# Patient Record
Sex: MALE | Race: WHITE | NOT HISPANIC OR LATINO | URBAN - METROPOLITAN AREA
[De-identification: names, ages, dates, MRNs, and addresses within clinical notes are randomized per-mention and may not be internally consistent; named-entity substitution may affect disease eponyms.]

---

## 2017-01-11 ENCOUNTER — EMERGENCY (EMERGENCY)
Facility: HOSPITAL | Age: 82
LOS: 0 days | Discharge: HOME | End: 2017-01-11
Admitting: EMERGENCY MEDICINE
Payer: COMMERCIAL

## 2017-01-11 DIAGNOSIS — R51 HEADACHE: ICD-10-CM

## 2017-01-11 DIAGNOSIS — Z79.01 LONG TERM (CURRENT) USE OF ANTICOAGULANTS: ICD-10-CM

## 2017-01-11 DIAGNOSIS — Z95.0 PRESENCE OF CARDIAC PACEMAKER: ICD-10-CM

## 2017-01-11 DIAGNOSIS — Z95.1 PRESENCE OF AORTOCORONARY BYPASS GRAFT: ICD-10-CM

## 2017-01-11 DIAGNOSIS — I25.10 ATHEROSCLEROTIC HEART DISEASE OF NATIVE CORONARY ARTERY WITHOUT ANGINA PECTORIS: ICD-10-CM

## 2017-01-11 DIAGNOSIS — Z79.02 LONG TERM (CURRENT) USE OF ANTITHROMBOTICS/ANTIPLATELETS: ICD-10-CM

## 2017-01-11 DIAGNOSIS — I10 ESSENTIAL (PRIMARY) HYPERTENSION: ICD-10-CM

## 2017-01-11 PROCEDURE — 93010 ELECTROCARDIOGRAM REPORT: CPT

## 2017-01-11 PROCEDURE — 70450 CT HEAD/BRAIN W/O DYE: CPT | Mod: 26

## 2017-01-11 PROCEDURE — 99284 EMERGENCY DEPT VISIT MOD MDM: CPT

## 2017-01-14 ENCOUNTER — INPATIENT (INPATIENT)
Facility: HOSPITAL | Age: 82
LOS: 1 days | Discharge: HOME HEALTH | End: 2017-01-16
Attending: FAMILY MEDICINE | Admitting: INTERNAL MEDICINE
Payer: MEDICARE

## 2017-01-14 PROCEDURE — 93010 ELECTROCARDIOGRAM REPORT: CPT

## 2017-01-14 PROCEDURE — 71010: CPT | Mod: 26

## 2017-01-14 PROCEDURE — 99285 EMERGENCY DEPT VISIT HI MDM: CPT

## 2017-01-15 PROCEDURE — 93010 ELECTROCARDIOGRAM REPORT: CPT

## 2017-01-20 DIAGNOSIS — J44.1 CHRONIC OBSTRUCTIVE PULMONARY DISEASE WITH (ACUTE) EXACERBATION: ICD-10-CM

## 2017-01-20 DIAGNOSIS — I25.10 ATHEROSCLEROTIC HEART DISEASE OF NATIVE CORONARY ARTERY WITHOUT ANGINA PECTORIS: ICD-10-CM

## 2017-01-20 DIAGNOSIS — Z87.891 PERSONAL HISTORY OF NICOTINE DEPENDENCE: ICD-10-CM

## 2017-01-20 DIAGNOSIS — I11.0 HYPERTENSIVE HEART DISEASE WITH HEART FAILURE: ICD-10-CM

## 2017-01-20 DIAGNOSIS — N40.0 BENIGN PROSTATIC HYPERPLASIA WITHOUT LOWER URINARY TRACT SYMPTOMS: ICD-10-CM

## 2017-01-20 DIAGNOSIS — I49.5 SICK SINUS SYNDROME: ICD-10-CM

## 2017-01-20 DIAGNOSIS — E03.9 HYPOTHYROIDISM, UNSPECIFIED: ICD-10-CM

## 2017-01-20 DIAGNOSIS — I48.91 UNSPECIFIED ATRIAL FIBRILLATION: ICD-10-CM

## 2017-01-20 DIAGNOSIS — I50.9 HEART FAILURE, UNSPECIFIED: ICD-10-CM

## 2017-01-30 ENCOUNTER — INPATIENT (INPATIENT)
Facility: HOSPITAL | Age: 82
LOS: 1 days | Discharge: HOME | End: 2017-02-01
Attending: FAMILY MEDICINE | Admitting: FAMILY MEDICINE
Payer: MEDICARE

## 2017-01-30 VITALS
TEMPERATURE: 98 F | SYSTOLIC BLOOD PRESSURE: 175 MMHG | RESPIRATION RATE: 20 BRPM | HEART RATE: 82 BPM | DIASTOLIC BLOOD PRESSURE: 86 MMHG

## 2017-01-30 DIAGNOSIS — J44.1 CHRONIC OBSTRUCTIVE PULMONARY DISEASE WITH (ACUTE) EXACERBATION: ICD-10-CM

## 2017-01-30 LAB
ALBUMIN SERPL ELPH-MCNC: 3.5 G/DL — SIGNIFICANT CHANGE UP (ref 3.3–5)
ALP SERPL-CCNC: 55 U/L — SIGNIFICANT CHANGE UP (ref 40–120)
ALT FLD-CCNC: 20 U/L — SIGNIFICANT CHANGE UP (ref 12–78)
ANION GAP SERPL CALC-SCNC: 7 MMOL/L — SIGNIFICANT CHANGE UP (ref 5–17)
APTT BLD: 34.7 SEC — SIGNIFICANT CHANGE UP (ref 27.5–37.4)
AST SERPL-CCNC: 16 U/L — SIGNIFICANT CHANGE UP (ref 15–37)
BASOPHILS # BLD AUTO: 0.1 K/UL — SIGNIFICANT CHANGE UP (ref 0–0.2)
BASOPHILS NFR BLD AUTO: 1.3 % — SIGNIFICANT CHANGE UP (ref 0–2)
BILIRUB SERPL-MCNC: 1.1 MG/DL — SIGNIFICANT CHANGE UP (ref 0.2–1.2)
BUN SERPL-MCNC: 11 MG/DL — SIGNIFICANT CHANGE UP (ref 7–23)
CALCIUM SERPL-MCNC: 8.5 MG/DL — SIGNIFICANT CHANGE UP (ref 8.5–10.1)
CHLORIDE SERPL-SCNC: 99 MMOL/L — SIGNIFICANT CHANGE UP (ref 96–108)
CO2 SERPL-SCNC: 31 MMOL/L — SIGNIFICANT CHANGE UP (ref 22–31)
CREAT SERPL-MCNC: 0.76 MG/DL — SIGNIFICANT CHANGE UP (ref 0.5–1.3)
EOSINOPHIL # BLD AUTO: 0.6 K/UL — HIGH (ref 0–0.5)
EOSINOPHIL NFR BLD AUTO: 6 % — SIGNIFICANT CHANGE UP (ref 0–6)
GLUCOSE SERPL-MCNC: 95 MG/DL — SIGNIFICANT CHANGE UP (ref 70–99)
HCT VFR BLD CALC: 39 % — SIGNIFICANT CHANGE UP (ref 39–50)
HGB BLD-MCNC: 12.9 G/DL — LOW (ref 13–17)
INR BLD: 1.42 RATIO — HIGH (ref 0.88–1.16)
LYMPHOCYTES # BLD AUTO: 1.3 K/UL — SIGNIFICANT CHANGE UP (ref 1–3.3)
LYMPHOCYTES # BLD AUTO: 13.2 % — SIGNIFICANT CHANGE UP (ref 13–44)
MCHC RBC-ENTMCNC: 29.7 PG — SIGNIFICANT CHANGE UP (ref 27–34)
MCHC RBC-ENTMCNC: 33.1 GM/DL — SIGNIFICANT CHANGE UP (ref 32–36)
MCV RBC AUTO: 89.6 FL — SIGNIFICANT CHANGE UP (ref 80–100)
MONOCYTES # BLD AUTO: 1.1 K/UL — HIGH (ref 0–0.9)
MONOCYTES NFR BLD AUTO: 11.5 % — SIGNIFICANT CHANGE UP (ref 2–14)
NEUTROPHILS # BLD AUTO: 6.7 K/UL — SIGNIFICANT CHANGE UP (ref 1.8–7.4)
NEUTROPHILS NFR BLD AUTO: 68.1 % — SIGNIFICANT CHANGE UP (ref 43–77)
NT-PROBNP SERPL-SCNC: 470 PG/ML — HIGH (ref 0–450)
PLATELET # BLD AUTO: 150 K/UL — SIGNIFICANT CHANGE UP (ref 150–400)
POTASSIUM SERPL-MCNC: 3.6 MMOL/L — SIGNIFICANT CHANGE UP (ref 3.5–5.3)
POTASSIUM SERPL-SCNC: 3.6 MMOL/L — SIGNIFICANT CHANGE UP (ref 3.5–5.3)
PROT SERPL-MCNC: 7.2 GM/DL — SIGNIFICANT CHANGE UP (ref 6–8.3)
PROTHROM AB SERPL-ACNC: 15.7 SEC — HIGH (ref 10–13.1)
RAPID RVP RESULT: SIGNIFICANT CHANGE UP
RBC # BLD: 4.36 M/UL — SIGNIFICANT CHANGE UP (ref 4.2–5.8)
RBC # FLD: 13.7 % — SIGNIFICANT CHANGE UP (ref 10.3–14.5)
SODIUM SERPL-SCNC: 137 MMOL/L — SIGNIFICANT CHANGE UP (ref 135–145)
TROPONIN I SERPL-MCNC: <0.015 NG/ML — SIGNIFICANT CHANGE UP (ref 0.01–0.04)
WBC # BLD: 9.8 K/UL — SIGNIFICANT CHANGE UP (ref 3.8–10.5)
WBC # FLD AUTO: 9.8 K/UL — SIGNIFICANT CHANGE UP (ref 3.8–10.5)

## 2017-01-30 PROCEDURE — 71010: CPT | Mod: 26

## 2017-01-30 PROCEDURE — 99285 EMERGENCY DEPT VISIT HI MDM: CPT

## 2017-01-30 PROCEDURE — 93010 ELECTROCARDIOGRAM REPORT: CPT

## 2017-01-30 RX ORDER — CEFUROXIME AXETIL 250 MG
500 TABLET ORAL EVERY 12 HOURS
Qty: 0 | Refills: 0 | Status: DISCONTINUED | OUTPATIENT
Start: 2017-01-30 | End: 2017-02-01

## 2017-01-30 RX ORDER — IPRATROPIUM/ALBUTEROL SULFATE 18-103MCG
3 AEROSOL WITH ADAPTER (GRAM) INHALATION EVERY 4 HOURS
Qty: 0 | Refills: 0 | Status: DISCONTINUED | OUTPATIENT
Start: 2017-01-30 | End: 2017-02-01

## 2017-01-30 RX ORDER — ISOSORBIDE MONONITRATE 60 MG/1
1 TABLET, EXTENDED RELEASE ORAL
Qty: 0 | Refills: 0 | COMMUNITY

## 2017-01-30 RX ORDER — APIXABAN 2.5 MG/1
2.5 TABLET, FILM COATED ORAL
Qty: 0 | Refills: 0 | Status: DISCONTINUED | OUTPATIENT
Start: 2017-01-30 | End: 2017-02-01

## 2017-01-30 RX ORDER — SIMVASTATIN 20 MG/1
20 TABLET, FILM COATED ORAL AT BEDTIME
Qty: 0 | Refills: 0 | Status: DISCONTINUED | OUTPATIENT
Start: 2017-01-30 | End: 2017-02-01

## 2017-01-30 RX ORDER — LISINOPRIL 2.5 MG/1
20 TABLET ORAL DAILY
Qty: 0 | Refills: 0 | Status: DISCONTINUED | OUTPATIENT
Start: 2017-01-30 | End: 2017-02-01

## 2017-01-30 RX ORDER — LISINOPRIL 2.5 MG/1
1 TABLET ORAL
Qty: 0 | Refills: 0 | COMMUNITY

## 2017-01-30 RX ORDER — ISOSORBIDE MONONITRATE 60 MG/1
30 TABLET, EXTENDED RELEASE ORAL DAILY
Qty: 0 | Refills: 0 | Status: DISCONTINUED | OUTPATIENT
Start: 2017-01-30 | End: 2017-02-01

## 2017-01-30 RX ORDER — CARVEDILOL PHOSPHATE 80 MG/1
6.25 CAPSULE, EXTENDED RELEASE ORAL EVERY 12 HOURS
Qty: 0 | Refills: 0 | Status: DISCONTINUED | OUTPATIENT
Start: 2017-01-30 | End: 2017-02-01

## 2017-01-30 RX ORDER — CLOPIDOGREL BISULFATE 75 MG/1
75 TABLET, FILM COATED ORAL DAILY
Qty: 0 | Refills: 0 | Status: DISCONTINUED | OUTPATIENT
Start: 2017-01-30 | End: 2017-02-01

## 2017-01-30 RX ORDER — IPRATROPIUM/ALBUTEROL SULFATE 18-103MCG
3 AEROSOL WITH ADAPTER (GRAM) INHALATION EVERY 6 HOURS
Qty: 0 | Refills: 0 | Status: DISCONTINUED | OUTPATIENT
Start: 2017-01-30 | End: 2017-02-01

## 2017-01-30 RX ORDER — ALBUTEROL 90 UG/1
2.5 AEROSOL, METERED ORAL ONCE
Qty: 0 | Refills: 0 | Status: COMPLETED | OUTPATIENT
Start: 2017-01-30 | End: 2017-01-30

## 2017-01-30 RX ORDER — DOXAZOSIN MESYLATE 4 MG
4 TABLET ORAL AT BEDTIME
Qty: 0 | Refills: 0 | Status: DISCONTINUED | OUTPATIENT
Start: 2017-01-30 | End: 2017-02-01

## 2017-01-30 RX ORDER — IPRATROPIUM/ALBUTEROL SULFATE 18-103MCG
3 AEROSOL WITH ADAPTER (GRAM) INHALATION ONCE
Qty: 0 | Refills: 0 | Status: COMPLETED | OUTPATIENT
Start: 2017-01-30 | End: 2017-01-30

## 2017-01-30 RX ORDER — LEVOTHYROXINE SODIUM 125 MCG
112 TABLET ORAL DAILY
Qty: 0 | Refills: 0 | Status: DISCONTINUED | OUTPATIENT
Start: 2017-01-30 | End: 2017-02-01

## 2017-01-30 RX ORDER — FINASTERIDE 5 MG/1
5 TABLET, FILM COATED ORAL DAILY
Qty: 0 | Refills: 0 | Status: DISCONTINUED | OUTPATIENT
Start: 2017-01-30 | End: 2017-02-01

## 2017-01-30 RX ORDER — AMLODIPINE BESYLATE 2.5 MG/1
5 TABLET ORAL DAILY
Qty: 0 | Refills: 0 | Status: DISCONTINUED | OUTPATIENT
Start: 2017-01-30 | End: 2017-02-01

## 2017-01-30 RX ORDER — BUDESONIDE, MICRONIZED 100 %
0.5 POWDER (GRAM) MISCELLANEOUS
Qty: 0 | Refills: 0 | Status: DISCONTINUED | OUTPATIENT
Start: 2017-01-30 | End: 2017-02-01

## 2017-01-30 RX ORDER — FUROSEMIDE 40 MG
20 TABLET ORAL DAILY
Qty: 0 | Refills: 0 | Status: DISCONTINUED | OUTPATIENT
Start: 2017-01-30 | End: 2017-02-01

## 2017-01-30 RX ORDER — PANTOPRAZOLE SODIUM 20 MG/1
40 TABLET, DELAYED RELEASE ORAL
Qty: 0 | Refills: 0 | Status: DISCONTINUED | OUTPATIENT
Start: 2017-01-30 | End: 2017-02-01

## 2017-01-30 RX ORDER — FUROSEMIDE 40 MG
20 TABLET ORAL DAILY
Qty: 0 | Refills: 0 | Status: DISCONTINUED | OUTPATIENT
Start: 2017-01-30 | End: 2017-01-30

## 2017-01-30 RX ADMIN — ALBUTEROL 2.5 MILLIGRAM(S): 90 AEROSOL, METERED ORAL at 15:56

## 2017-01-30 RX ADMIN — Medication 125 MILLIGRAM(S): at 15:56

## 2017-01-30 RX ADMIN — Medication 3 MILLILITER(S): at 21:15

## 2017-01-30 NOTE — ED PROVIDER NOTE - ATTENDING CONTRIBUTION TO CARE
81M hx COPD, HTN, recent admission for COPD exac returns with dyspnea -- recurrent COPD exac.   Nebs, steroids, CXR, admit

## 2017-01-30 NOTE — ED PROVIDER NOTE - OBJECTIVE STATEMENT
81M hx COPD on home nebs, HTN, CHF presents with worsening dyspnea x 3 days.  Pt recent admitted for COPD exacerbation and d/c'd with course of prednisone completed 3 days ago.  Today patient was visited by home nurse that found patient dyspneic with tachypnea and retractions.  Minimal improvement s/p 3 duonebs.  As per EMS pt was in respiratory distress wheezing diffusely with SO2 >92%.  Pt did not tolerate additionally albuterol neb 2/2 anxiety so it was aborted in favor of bipap.  Pt demonstrated improvement.        PMD - Ramos Hernandez

## 2017-01-31 LAB
ANION GAP SERPL CALC-SCNC: 8 MMOL/L — SIGNIFICANT CHANGE UP (ref 5–17)
APTT BLD: 33 SEC — SIGNIFICANT CHANGE UP (ref 27.5–37.4)
BASOPHILS # BLD AUTO: 0 K/UL — SIGNIFICANT CHANGE UP (ref 0–0.2)
BASOPHILS NFR BLD AUTO: 0.3 % — SIGNIFICANT CHANGE UP (ref 0–2)
BUN SERPL-MCNC: 14 MG/DL — SIGNIFICANT CHANGE UP (ref 7–23)
CALCIUM SERPL-MCNC: 8.8 MG/DL — SIGNIFICANT CHANGE UP (ref 8.5–10.1)
CHLORIDE SERPL-SCNC: 100 MMOL/L — SIGNIFICANT CHANGE UP (ref 96–108)
CO2 SERPL-SCNC: 29 MMOL/L — SIGNIFICANT CHANGE UP (ref 22–31)
CREAT SERPL-MCNC: 0.86 MG/DL — SIGNIFICANT CHANGE UP (ref 0.5–1.3)
EOSINOPHIL # BLD AUTO: 0 K/UL — SIGNIFICANT CHANGE UP (ref 0–0.5)
EOSINOPHIL NFR BLD AUTO: 0.1 % — SIGNIFICANT CHANGE UP (ref 0–6)
GLUCOSE SERPL-MCNC: 137 MG/DL — HIGH (ref 70–99)
HCT VFR BLD CALC: 38.4 % — LOW (ref 39–50)
HGB BLD-MCNC: 12.4 G/DL — LOW (ref 13–17)
INR BLD: 1.32 RATIO — HIGH (ref 0.88–1.16)
LYMPHOCYTES # BLD AUTO: 0.6 K/UL — LOW (ref 1–3.3)
LYMPHOCYTES # BLD AUTO: 14.1 % — SIGNIFICANT CHANGE UP (ref 13–44)
MCHC RBC-ENTMCNC: 28.9 PG — SIGNIFICANT CHANGE UP (ref 27–34)
MCHC RBC-ENTMCNC: 32.3 GM/DL — SIGNIFICANT CHANGE UP (ref 32–36)
MCV RBC AUTO: 89.5 FL — SIGNIFICANT CHANGE UP (ref 80–100)
MONOCYTES # BLD AUTO: 0.2 K/UL — SIGNIFICANT CHANGE UP (ref 0–0.9)
MONOCYTES NFR BLD AUTO: 5.1 % — SIGNIFICANT CHANGE UP (ref 2–14)
NEUTROPHILS # BLD AUTO: 3.2 K/UL — SIGNIFICANT CHANGE UP (ref 1.8–7.4)
NEUTROPHILS NFR BLD AUTO: 80.3 % — HIGH (ref 43–77)
PLATELET # BLD AUTO: 155 K/UL — SIGNIFICANT CHANGE UP (ref 150–400)
POTASSIUM SERPL-MCNC: 3.4 MMOL/L — LOW (ref 3.5–5.3)
POTASSIUM SERPL-SCNC: 3.4 MMOL/L — LOW (ref 3.5–5.3)
PROTHROM AB SERPL-ACNC: 14.6 SEC — HIGH (ref 10–13.1)
RBC # BLD: 4.3 M/UL — SIGNIFICANT CHANGE UP (ref 4.2–5.8)
RBC # FLD: 13.6 % — SIGNIFICANT CHANGE UP (ref 10.3–14.5)
SODIUM SERPL-SCNC: 137 MMOL/L — SIGNIFICANT CHANGE UP (ref 135–145)
WBC # BLD: 4 K/UL — SIGNIFICANT CHANGE UP (ref 3.8–10.5)
WBC # FLD AUTO: 4 K/UL — SIGNIFICANT CHANGE UP (ref 3.8–10.5)

## 2017-01-31 PROCEDURE — 99285 EMERGENCY DEPT VISIT HI MDM: CPT

## 2017-01-31 RX ORDER — INFLUENZA VIRUS VACCINE 15; 15; 15; 15 UG/.5ML; UG/.5ML; UG/.5ML; UG/.5ML
0.5 SUSPENSION INTRAMUSCULAR ONCE
Qty: 0 | Refills: 0 | Status: COMPLETED | OUTPATIENT
Start: 2017-01-31 | End: 2017-01-31

## 2017-01-31 RX ORDER — POTASSIUM CHLORIDE 20 MEQ
40 PACKET (EA) ORAL ONCE
Qty: 0 | Refills: 0 | Status: COMPLETED | OUTPATIENT
Start: 2017-01-31 | End: 2017-01-31

## 2017-01-31 RX ADMIN — Medication 60 MILLIGRAM(S): at 07:16

## 2017-01-31 RX ADMIN — INFLUENZA VIRUS VACCINE 0.5 MILLILITER(S): 15; 15; 15; 15 SUSPENSION INTRAMUSCULAR at 16:57

## 2017-01-31 RX ADMIN — AMLODIPINE BESYLATE 5 MILLIGRAM(S): 2.5 TABLET ORAL at 07:15

## 2017-01-31 RX ADMIN — CARVEDILOL PHOSPHATE 6.25 MILLIGRAM(S): 80 CAPSULE, EXTENDED RELEASE ORAL at 07:19

## 2017-01-31 RX ADMIN — Medication 3 MILLILITER(S): at 03:25

## 2017-01-31 RX ADMIN — Medication 112 MICROGRAM(S): at 07:21

## 2017-01-31 RX ADMIN — SIMVASTATIN 20 MILLIGRAM(S): 20 TABLET, FILM COATED ORAL at 23:07

## 2017-01-31 RX ADMIN — Medication 3 MILLILITER(S): at 19:16

## 2017-01-31 RX ADMIN — Medication 3 MILLILITER(S): at 14:05

## 2017-01-31 RX ADMIN — Medication 3 MILLILITER(S): at 00:01

## 2017-01-31 RX ADMIN — Medication 4 MILLIGRAM(S): at 21:39

## 2017-01-31 RX ADMIN — CARVEDILOL PHOSPHATE 6.25 MILLIGRAM(S): 80 CAPSULE, EXTENDED RELEASE ORAL at 16:54

## 2017-01-31 RX ADMIN — Medication 500 MILLIGRAM(S): at 16:53

## 2017-01-31 RX ADMIN — Medication 20 MILLIGRAM(S): at 07:14

## 2017-01-31 RX ADMIN — Medication 60 MILLIGRAM(S): at 21:41

## 2017-01-31 RX ADMIN — FINASTERIDE 5 MILLIGRAM(S): 5 TABLET, FILM COATED ORAL at 10:36

## 2017-01-31 RX ADMIN — Medication 500 MILLIGRAM(S): at 07:20

## 2017-01-31 RX ADMIN — LISINOPRIL 20 MILLIGRAM(S): 2.5 TABLET ORAL at 07:24

## 2017-01-31 RX ADMIN — Medication 60 MILLIGRAM(S): at 14:06

## 2017-01-31 RX ADMIN — ISOSORBIDE MONONITRATE 30 MILLIGRAM(S): 60 TABLET, EXTENDED RELEASE ORAL at 10:37

## 2017-01-31 RX ADMIN — APIXABAN 2.5 MILLIGRAM(S): 2.5 TABLET, FILM COATED ORAL at 16:54

## 2017-01-31 RX ADMIN — CLOPIDOGREL BISULFATE 75 MILLIGRAM(S): 75 TABLET, FILM COATED ORAL at 10:37

## 2017-01-31 RX ADMIN — Medication 0.5 MILLIGRAM(S): at 19:16

## 2017-01-31 RX ADMIN — APIXABAN 2.5 MILLIGRAM(S): 2.5 TABLET, FILM COATED ORAL at 07:18

## 2017-01-31 RX ADMIN — PANTOPRAZOLE SODIUM 40 MILLIGRAM(S): 20 TABLET, DELAYED RELEASE ORAL at 09:33

## 2017-01-31 RX ADMIN — Medication 40 MILLIEQUIVALENT(S): at 16:53

## 2017-01-31 NOTE — PROGRESS NOTE ADULT - ASSESSMENT
A/P:  C/w steroids, nebs.  Likely ready for discharge tomorrow with steroid taper.  Pt does not need to see pulm here.  He has appointment with his pulmonologist Dr. Hernandez on 2/3/17.  Attempted to reach Dr. Hernandez however he is out of the office until 2/2

## 2017-01-31 NOTE — H&P ADULT - ASSESSMENT
HPI: :: Pt is a 82yo male w/ extensive pmhx of CAD s/p CABG 4V (2003), COPD (not on home O2), SSS s/p PPM,CHF with EF 40%,(2006), Afib on Eliquis, BPH, presents with SOB.  recently hospitalised 1/14-1/17 for COPD exacerbation    presented to ED via EMS for worsening of dyspnea for 3 days .Was noted to be dyspneic and tachypneic by VNS at home and called ems.  In ED pulse ox 92% on arrival which improved on BIPAP,now on 2 L nasal cannula  patient reports SOB and wheezing significantly improved in ED after he was given neb tx and IV solumedrol  CXR chronic right pleural effuison ,no obvious consolidation   RVP negative   ProBNP 470  EKG paced rhythm    patient denies CP or nausea or vomiting or fever,or palpitations or diaphoresis ,SOB signficantly improved      A/P   Acute COPD exacerbation /no acute CHf exacerbation or consolidation on CXR  admit to regular floor  neb tx prn and round the clock  solumedrol IV  cefuroxime po  pulmicort  pulmonary consult  continue pulse ox monitoring    hx of chronic systolic heart failure,/CAD/Afib/SSS with PPM stable   continue lasix po  ACEinhibitor   asa,plavix statin  coreg  imdur  eliquis    DVT prophylaxis   heparin sc HPI: :: Pt is a 80yo male w/ extensive pmhx of CAD s/p CABG 4V (2003), COPD (not on home O2), SSS s/p PPM,CHF with EF 40%,(2006), Afib on Eliquis, BPH, presents with SOB.  recently hospitalised 1/14-1/17 for COPD exacerbation    presented to ED via EMS for worsening of dyspnea for 3 days .Was noted to be dyspneic and tachypneic by VNS at home and called ems.  In ED pulse ox 92% on arrival which improved on BIPAP,now on 2 L nasal cannula  patient reports SOB and wheezing significantly improved in ED after he was given neb tx and IV solumedrol  CXR chronic right pleural effuison ,no obvious consolidation   RVP negative   ProBNP 470  EKG paced rhythm    patient denies CP or nausea or vomiting or fever,or palpitations or diaphoresis ,SOB signficantly improved      A/P   Acute COPD exacerbation /no acute CHf exacerbation or consolidation on CXR  admit to regular floor  neb tx prn and round the clock  solumedrol IV  cefuroxime po  pulmicort  pulmonary consult  continue pulse ox monitoring    hx of chronic systolic heart failure,/CAD/Afib/SSS with PPM stable   continue lasix po  ACEinhibitor   ,plavix statin  coreg  imdur  eliquis    DVT prophylaxis   heparin sc HPI: :: Pt is a 82yo male w/ extensive pmhx of CAD s/p CABG 4V (2003), COPD (not on home O2), SSS s/p PPM,CHF with EF 40%,(2006), Afib on Eliquis, BPH, presents with SOB.  recently hospitalised 1/14-1/17 for COPD exacerbation    presented to ED via EMS for worsening of dyspnea for 3 days .Was noted to be dyspneic and tachypneic by VNS at home and called ems.  In ED pulse ox 92% on arrival which improved on BIPAP,now on 2 L nasal cannula  patient reports SOB and wheezing significantly improved in ED after he was given neb tx and IV solumedrol  CXR chronic right pleural effuison ,no obvious consolidation   RVP negative   ProBNP 470  EKG paced rhythm    patient denies CP or nausea or vomiting or fever,or palpitations or diaphoresis ,SOB signficantly improved      A/P   Acute COPD exacerbation /no acute CHf exacerbation or consolidation on CXR  admit to regular floor  neb tx prn and round the clock  solumedrol IV  cefuroxime po  pulmicort  pulmonary consult  continue pulse ox monitoring    hx of chronic systolic heart failure,/CAD/Afib/SSS with PPM stable   continue lasix po  ACEinhibitor   ,plavix statin  coreg  imdur  eliquis    DVT prophylaxis   on eliquis

## 2017-01-31 NOTE — PROGRESS NOTE ADULT - SUBJECTIVE AND OBJECTIVE BOX
S:  1/31 progress:  Pt feeling better, not as sob, near baseline.  Reports that the sob gets worse when the steroid taper ends.    O:  On exam he has mild wheezing mostly in the RODRIGUEZ.  BL LE pitting edema 2+.      .

## 2017-01-31 NOTE — H&P ADULT - HISTORY OF PRESENT ILLNESS
HPI: :: Pt is a 80yo male w/ extensive pmhx of CAD s/p CABG 4V (2003), COPD (not on home O2), SSS s/p PPM,CHF with EF 40%,(2006), Afib on Eliquis, BPH, presents with SOB.  recently hospitalised 1/14-1/17 for COPD exacerbation    presented to ED via EMS for worsening of dyspnea for 3 days .Was noted to be dyspneic and tachypneic by VNS at home and called ems.  In ED pulse ox 92% on arrival which improved on BIPAP,now on 2 L nasal cannula  patient reports SOB and wheezing significantly improved in ED after he was given neb tx and IV solumedrol  CXR chronic right pleural effuison ,no obvious consolidation   RVP negative   ProBNP 470  EKG paced rhythm    patient denies CP or nausea or vomiting or fever,or palpitations or diaphoresis ,SOB signficantly improved       PMHx: :: as per hpi  PSHx: :: CABG  Lung resection  Family History: :: NC  Social History: :: Former smoker, quit 20y ago  Denies etOH, drug use  Lives at home

## 2017-02-01 ENCOUNTER — TRANSCRIPTION ENCOUNTER (OUTPATIENT)
Age: 82
End: 2017-02-01

## 2017-02-01 VITALS — WEIGHT: 205.91 LBS

## 2017-02-01 RX ORDER — FUROSEMIDE 40 MG
1 TABLET ORAL
Qty: 30 | Refills: 0 | OUTPATIENT
Start: 2017-02-01 | End: 2017-03-03

## 2017-02-01 RX ORDER — POTASSIUM CHLORIDE 20 MEQ
20 PACKET (EA) ORAL ONCE
Qty: 0 | Refills: 0 | Status: COMPLETED | OUTPATIENT
Start: 2017-02-01 | End: 2017-02-01

## 2017-02-01 RX ORDER — CEFUROXIME AXETIL 250 MG
1 TABLET ORAL
Qty: 10 | Refills: 0 | OUTPATIENT
Start: 2017-02-01 | End: 2017-02-06

## 2017-02-01 RX ORDER — PANTOPRAZOLE SODIUM 20 MG/1
1 TABLET, DELAYED RELEASE ORAL
Qty: 30 | Refills: 0 | OUTPATIENT
Start: 2017-02-01 | End: 2017-03-03

## 2017-02-01 RX ORDER — TIOTROPIUM BROMIDE 18 UG/1
1 CAPSULE ORAL; RESPIRATORY (INHALATION)
Qty: 30 | Refills: 0 | OUTPATIENT
Start: 2017-02-01 | End: 2017-03-03

## 2017-02-01 RX ADMIN — AMLODIPINE BESYLATE 5 MILLIGRAM(S): 2.5 TABLET ORAL at 05:29

## 2017-02-01 RX ADMIN — Medication 3 MILLILITER(S): at 11:17

## 2017-02-01 RX ADMIN — Medication 20 MILLIEQUIVALENT(S): at 13:42

## 2017-02-01 RX ADMIN — Medication 60 MILLIGRAM(S): at 05:29

## 2017-02-01 RX ADMIN — PANTOPRAZOLE SODIUM 40 MILLIGRAM(S): 20 TABLET, DELAYED RELEASE ORAL at 08:54

## 2017-02-01 RX ADMIN — CLOPIDOGREL BISULFATE 75 MILLIGRAM(S): 75 TABLET, FILM COATED ORAL at 09:05

## 2017-02-01 RX ADMIN — Medication 3 MILLILITER(S): at 04:07

## 2017-02-01 RX ADMIN — Medication 20 MILLIGRAM(S): at 09:03

## 2017-02-01 RX ADMIN — Medication 112 MICROGRAM(S): at 05:29

## 2017-02-01 RX ADMIN — APIXABAN 2.5 MILLIGRAM(S): 2.5 TABLET, FILM COATED ORAL at 05:29

## 2017-02-01 RX ADMIN — LISINOPRIL 20 MILLIGRAM(S): 2.5 TABLET ORAL at 09:04

## 2017-02-01 RX ADMIN — FINASTERIDE 5 MILLIGRAM(S): 5 TABLET, FILM COATED ORAL at 09:05

## 2017-02-01 RX ADMIN — Medication 500 MILLIGRAM(S): at 08:53

## 2017-02-01 RX ADMIN — Medication 0.5 MILLIGRAM(S): at 11:18

## 2017-02-01 RX ADMIN — ISOSORBIDE MONONITRATE 30 MILLIGRAM(S): 60 TABLET, EXTENDED RELEASE ORAL at 09:04

## 2017-02-01 RX ADMIN — Medication 60 MILLIGRAM(S): at 13:43

## 2017-02-01 RX ADMIN — Medication 3 MILLILITER(S): at 00:26

## 2017-02-01 RX ADMIN — CARVEDILOL PHOSPHATE 6.25 MILLIGRAM(S): 80 CAPSULE, EXTENDED RELEASE ORAL at 09:03

## 2017-02-01 NOTE — DISCHARGE NOTE ADULT - HOSPITAL COURSE
80 yo male pmh CABG 2003, SSS s/p PPM CHF EF 40% afib on eliquis p/w sob and wheezing found to be in AECOPD s/p abx and  prophylactic steroids now improving, O2 sat 97 % on RA. Pt to follow up with pulmonary in 1 week and continue with steroid taper and ceftin for an add'l  5 more days

## 2017-02-01 NOTE — DISCHARGE NOTE ADULT - PATIENT PORTAL LINK FT
“You can access the Follow[employer] Patient Portal, offered by [facility], by registering with the following website: [email or web address]”

## 2017-02-01 NOTE — DISCHARGE NOTE ADULT - CARE PLAN
Principal Discharge DX:	COPD exacerbation  Goal:	steroids  Instructions for follow-up, activity and diet:	follow up with pulmonary in 1 week. continue with meds as indicated

## 2017-02-01 NOTE — DISCHARGE NOTE ADULT - PROVIDER TOKENS
FREE:[LAST:[deysi],FIRST:[ramona],PHONE:[(   )    -],FAX:[(   )    -]],FREE:[LAST:[annie],FIRST:[],PHONE:[(   )    -],FAX:[(   )    -]]

## 2017-02-01 NOTE — DISCHARGE NOTE ADULT - MEDICATION SUMMARY - MEDICATIONS TO TAKE
I will START or STAY ON the medications listed below when I get home from the hospital:    finasteride 5 mg oral tablet  -- 1 tab(s) by mouth once a day  -- Indication: For bph    predniSONE 20 mg oral tablet  -- 2 tab(s) by mouth once a day x 3 days  1 tab(s) by mouth once a day x 3 days  0.5 tab by mouth once a day x 3 days    dispense 11 tabs  -- It is very important that you take or use this exactly as directed.  Do not skip doses or discontinue unless directed by your doctor.  Obtain medical advice before taking any non-prescription drugs as some may affect the action of this medication.  Take with food or milk.    -- Indication: For COPD exacerbation    lisinopril 20 mg oral tablet  -- 1 tab(s) by mouth once a day  -- Indication: For hypertension    terazosin 5 mg oral capsule  -- 1 cap(s) by mouth once a day (at bedtime)  -- Indication: For bph    isosorbide mononitrate 30 mg oral tablet, extended release  -- 1 tab(s) by mouth once a day (in the morning)  -- Indication: For hypertension    Eliquis 2.5 mg oral tablet  -- 1 tab(s) by mouth 2 times a day  -- Indication: For atrial fibrillation    Zocor 20 mg oral tablet  -- 1 tab(s) by mouth once a day (at bedtime)  -- Indication: For hyperlipidemia    Plavix 75 mg oral tablet  -- 1 tab(s) by mouth once a day  -- Indication: For Cad    Coreg 6.25 mg oral tablet  -- 1 tab(s) by mouth 2 times a day  -- Indication: For hypertension    Spiriva 18 mcg inhalation capsule  -- 1 cap(s) inhaled once a day  -- Check with your doctor before becoming pregnant.  For inhalation only.  It is very important that you take or use this exactly as directed.  Do not skip doses or discontinue unless directed by your doctor.  Obtain medical advice before taking any non-prescription drugs as some may affect the action of this medication.    -- Indication: For COPD exacerbation    Norvasc 5 mg oral tablet  -- 1 tab(s) by mouth once a day  -- Indication: For hypertension    cefuroxime 500 mg oral tablet  -- 1 tab(s) by mouth 2 times a day  -- Finish all this medication unless otherwise directed by prescriber.  Medication should be taken with plenty of water.  Take with food or milk.    -- Indication: For COPD exacerbation    furosemide 20 mg oral tablet  -- 1 tab(s) by mouth once a day  -- Indication: For pleural effusion    omeprazole 40 mg oral delayed release capsule  -- 1 cap(s) by mouth once a day  -- Indication: For gastrointestinal protection    Synthroid 112 mcg (0.112 mg) oral tablet  -- 1 tab(s) by mouth once a day  -- Indication: For hypothyroidism

## 2017-02-03 ENCOUNTER — OTHER (OUTPATIENT)
Age: 82
End: 2017-02-03

## 2017-02-03 DIAGNOSIS — N40.0 BENIGN PROSTATIC HYPERPLASIA WITHOUT LOWER URINARY TRACT SYMPTOMS: ICD-10-CM

## 2017-02-03 DIAGNOSIS — Z87.891 PERSONAL HISTORY OF NICOTINE DEPENDENCE: ICD-10-CM

## 2017-02-03 DIAGNOSIS — I25.10 ATHEROSCLEROTIC HEART DISEASE OF NATIVE CORONARY ARTERY WITHOUT ANGINA PECTORIS: ICD-10-CM

## 2017-02-03 DIAGNOSIS — Z95.1 PRESENCE OF AORTOCORONARY BYPASS GRAFT: ICD-10-CM

## 2017-02-03 DIAGNOSIS — Z79.02 LONG TERM (CURRENT) USE OF ANTITHROMBOTICS/ANTIPLATELETS: ICD-10-CM

## 2017-02-03 DIAGNOSIS — Z79.01 LONG TERM (CURRENT) USE OF ANTICOAGULANTS: ICD-10-CM

## 2017-02-03 DIAGNOSIS — J44.1 CHRONIC OBSTRUCTIVE PULMONARY DISEASE WITH (ACUTE) EXACERBATION: ICD-10-CM

## 2017-02-03 DIAGNOSIS — Z23 ENCOUNTER FOR IMMUNIZATION: ICD-10-CM

## 2017-02-03 DIAGNOSIS — I11.0 HYPERTENSIVE HEART DISEASE WITH HEART FAILURE: ICD-10-CM

## 2017-02-03 DIAGNOSIS — F41.9 ANXIETY DISORDER, UNSPECIFIED: ICD-10-CM

## 2017-02-03 DIAGNOSIS — I48.91 UNSPECIFIED ATRIAL FIBRILLATION: ICD-10-CM

## 2017-02-03 DIAGNOSIS — Z95.0 PRESENCE OF CARDIAC PACEMAKER: ICD-10-CM

## 2017-02-03 DIAGNOSIS — Z79.82 LONG TERM (CURRENT) USE OF ASPIRIN: ICD-10-CM

## 2017-02-03 DIAGNOSIS — I50.22 CHRONIC SYSTOLIC (CONGESTIVE) HEART FAILURE: ICD-10-CM

## 2017-02-21 RX ORDER — PREDNISONE 10 MG/1
10 TABLET ORAL
Qty: 30 | Refills: 3 | Status: DISCONTINUED | COMMUNITY
Start: 2017-01-30 | End: 2017-02-21

## 2017-02-23 ENCOUNTER — APPOINTMENT (OUTPATIENT)
Dept: PULMONOLOGY | Facility: CLINIC | Age: 82
End: 2017-02-23

## 2017-02-23 VITALS
BODY MASS INDEX: 28.48 KG/M2 | HEART RATE: 76 BPM | WEIGHT: 210 LBS | DIASTOLIC BLOOD PRESSURE: 80 MMHG | SYSTOLIC BLOOD PRESSURE: 126 MMHG | OXYGEN SATURATION: 96 %

## 2017-02-23 PROBLEM — J44.9 CHRONIC OBSTRUCTIVE PULMONARY DISEASE, UNSPECIFIED: Chronic | Status: ACTIVE | Noted: 2017-01-30

## 2017-02-24 RX ORDER — BUDESONIDE 180 UG/1
180 AEROSOL, POWDER RESPIRATORY (INHALATION) DAILY
Qty: 3 | Refills: 1 | Status: DISCONTINUED | COMMUNITY
Start: 2017-02-23 | End: 2017-02-24

## 2017-03-01 ENCOUNTER — OTHER (OUTPATIENT)
Age: 82
End: 2017-03-01

## 2017-04-20 ENCOUNTER — RX RENEWAL (OUTPATIENT)
Age: 82
End: 2017-04-20

## 2017-04-27 ENCOUNTER — RX RENEWAL (OUTPATIENT)
Age: 82
End: 2017-04-27

## 2017-04-27 RX ORDER — FLUTICASONE PROPIONATE 110 UG/1
110 AEROSOL, METERED RESPIRATORY (INHALATION) TWICE DAILY
Qty: 3 | Refills: 3 | Status: ACTIVE | COMMUNITY
Start: 2017-03-01 | End: 1900-01-01

## 2017-05-30 RX ORDER — APIXABAN 2.5 MG/1
1 TABLET, FILM COATED ORAL
Qty: 0 | Refills: 0 | COMMUNITY

## 2017-05-30 RX ORDER — AMLODIPINE BESYLATE 2.5 MG/1
1 TABLET ORAL
Qty: 0 | Refills: 0 | COMMUNITY

## 2017-05-30 NOTE — H&P ADULT - NSHPPHYSICALEXAM_GEN_ALL_CORE
Vital Signs : BP        HR       RR    Constitutional: well developed, well nourished, no deformities and no acute distress    Neurological: Alert & Oriented x 3, SPAIN, no focal deficits    HEENT: NC/AT, PERRLA, EOMI,  Neck supple.    Respiratory: CTA B/L, No wheezing/crackles/rhonchi    Cardiovascular: (+) S1 & S2, RRR, No m/r/g    Gastrointestinal: soft, NT, nondistended, (+) BS    Genitourinary: non distended bladder, voiding freely    Extremities: No pedal edema, No clubbing, No cyanosis    Skin:  normal skin color and pigmentation, no skin lesions Vital Signs : /86       HR  60     RR 16    Constitutional: well developed, well nourished, no deformities and no acute distress    Neurological: Alert & Oriented x 3, SPAIN, no focal deficits    HEENT: NC/AT, PERRLA, EOMI,  Neck supple.    Respiratory: CTA B/L, No wheezing/crackles/rhonchi Diminished throughout    Cardiovascular: (+) S1 & S2, RRR, No m/r/g    Gastrointestinal: soft, NT, nondistended, (+) BS    Genitourinary: non distended bladder, voiding freely and frequently    Extremities:  2+pedal edema right > left, No clubbing, No cyanosis Chronic venous stasis changes to bilat lower extremities. Dopplerable DP's    Skin:  normal skin color and pigmentation, no skin lesions

## 2017-05-30 NOTE — H&P ADULT - PMH
Chronic obstructive pulmonary disease, unspecified COPD type Atypical TB    Benign nodular prostatic hyperplasia, presence of lower urinary tract symptoms unspecified    Chronic obstructive pulmonary disease, unspecified COPD type    Chronic venous insufficiency    Coronary artery disease involving native coronary artery, angina presence unspecified, unspecified whether native or transplanted heart    PAF (paroxysmal atrial fibrillation)

## 2017-05-30 NOTE — H&P ADULT - PSH
Pacemaker    S/P CABG (coronary artery bypass graft) Pacemaker    S/P CABG (coronary artery bypass graft)    S/P colon resection    Status post lobectomy of lung

## 2017-05-30 NOTE — H&P ADULT - PROBLEM SELECTOR PROBLEM 1
Coronary artery disease involving native coronary artery, angina presence unspecified, unspecified whether native or transplanted heart

## 2017-05-30 NOTE — H&P ADULT - HISTORY OF PRESENT ILLNESS
80yo M with PMHx of CAD s/p CABG in 2005, COPD on chronic steroids, HTN, PAfib on eliquis, s/p PPM. Pt c/o increasing chest pain on exertion since last Nov. 2016 80yo M with PMHx of CAD s/p CABG x 4 in 2005, COPD on chronic steroids, HTN, PAfib on eliquis, s/p PPM. Pt c/o increasing chest pain on exertion since last Nov. 2016 PET scan revealed

## 2017-05-30 NOTE — H&P ADULT - NSHPREVIEWOFSYSTEMS_GEN_ALL_CORE
General: Pt denies recent weight loss/fever/chills    Neurological: denies numbness or  sensation loss    HEENT: denies visual changes, no hearing loss, denies sore throat    Cardiovascular: denies chest pain/palpitations/leg edema    Respiratory and Thorax: denies SOB/cough/wheezing    Gastrointestinal: denies abdominal pain/diarrhea/constipation/bloody stool    Genitourinary: denies urinary frequency/urgency/ dysuria    Musculoskeletal: denies joint pain or swelling, denies restricted motion    Skin: denies rashes/sores    Endocrine: denies heat or cold intolerance/excessive thirst    Hematologic: denies abnormal bleeding General: Pt denies recent weight loss/fever/chills    Neurological: denies numbness or  sensation loss    HEENT: denies visual changes, no hearing loss, denies sore throat    Cardiovascular: chest pain with minimal exertion/palpitations/leg edema Claudication with minmal exertion    Respiratory and Thorax: chronic SOB/cough/wheezing    Gastrointestinal: denies abdominal pain/diarrhea/constipation/bloody stool    Genitourinary: positive urinary frequency/urgency/ dysuria    Musculoskeletal: denies joint pain or swelling, denies restricted motion    Skin: denies rashes/sores    Endocrine: denies heat or cold intolerance/excessive thirst    Hematologic: denies abnormal bleeding

## 2017-05-30 NOTE — H&P ADULT - PROBLEM SELECTOR PLAN 1
Pt seen and examined.  Preprocedure teaching completed with pt and daughter  NPO   Pt stopped Eliquis on Tuesday  Informed consent witnessed.

## 2017-05-30 NOTE — H&P ADULT - FAMILY HISTORY
No pertinent family history in first degree relatives Father  Still living? No  Family history of early CAD, Age at diagnosis: Age Unknown

## 2017-05-30 NOTE — H&P ADULT - NSHPLABSRESULTS_GEN_ALL_CORE
echo (8/1/2016) LVEF 55%  EKG: SR , AV paced 60bpm  cardiac cath ( 12/29/2-15) TVD, patent SVG to RCA, difficult to engage native vessels & grafts due to torturous aorta

## 2017-05-30 NOTE — H&P ADULT - NSHPSOCIALHISTORY_GEN_ALL_CORE
ex-smoker Quit smoking 1964 (formerly 2.5 PPD x 20 years)  Former heavy ETOH  Quit 1960's    Lives alone  1 daughter

## 2017-05-31 ENCOUNTER — OUTPATIENT (OUTPATIENT)
Dept: OUTPATIENT SERVICES | Facility: HOSPITAL | Age: 82
LOS: 1 days | Discharge: HOME | End: 2017-05-31

## 2017-05-31 VITALS — DIASTOLIC BLOOD PRESSURE: 92 MMHG | SYSTOLIC BLOOD PRESSURE: 171 MMHG | TEMPERATURE: 98 F | HEART RATE: 57 BPM

## 2017-05-31 VITALS
HEART RATE: 60 BPM | DIASTOLIC BLOOD PRESSURE: 76 MMHG | SYSTOLIC BLOOD PRESSURE: 147 MMHG | OXYGEN SATURATION: 98 % | RESPIRATION RATE: 16 BRPM

## 2017-05-31 DIAGNOSIS — Z90.2 ACQUIRED ABSENCE OF LUNG [PART OF]: Chronic | ICD-10-CM

## 2017-05-31 DIAGNOSIS — Z95.1 PRESENCE OF AORTOCORONARY BYPASS GRAFT: Chronic | ICD-10-CM

## 2017-05-31 DIAGNOSIS — I25.10 ATHEROSCLEROTIC HEART DISEASE OF NATIVE CORONARY ARTERY WITHOUT ANGINA PECTORIS: ICD-10-CM

## 2017-05-31 DIAGNOSIS — Z90.49 ACQUIRED ABSENCE OF OTHER SPECIFIED PARTS OF DIGESTIVE TRACT: Chronic | ICD-10-CM

## 2017-05-31 DIAGNOSIS — Z95.0 PRESENCE OF CARDIAC PACEMAKER: Chronic | ICD-10-CM

## 2017-05-31 LAB
ANION GAP SERPL CALC-SCNC: 6 MMOL/L — SIGNIFICANT CHANGE UP (ref 5–17)
BUN SERPL-MCNC: 18 MG/DL — SIGNIFICANT CHANGE UP (ref 7–23)
CALCIUM SERPL-MCNC: 9.1 MG/DL — SIGNIFICANT CHANGE UP (ref 8.5–10.1)
CHLORIDE SERPL-SCNC: 105 MMOL/L — SIGNIFICANT CHANGE UP (ref 96–108)
CO2 SERPL-SCNC: 31 MMOL/L — SIGNIFICANT CHANGE UP (ref 22–31)
CREAT SERPL-MCNC: 0.84 MG/DL — SIGNIFICANT CHANGE UP (ref 0.5–1.3)
GLUCOSE SERPL-MCNC: 88 MG/DL — SIGNIFICANT CHANGE UP (ref 70–99)
HCT VFR BLD CALC: 40.6 % — SIGNIFICANT CHANGE UP (ref 39–50)
HGB BLD-MCNC: 13.4 G/DL — SIGNIFICANT CHANGE UP (ref 13–17)
MCHC RBC-ENTMCNC: 28.7 PG — SIGNIFICANT CHANGE UP (ref 27–34)
MCHC RBC-ENTMCNC: 32.8 GM/DL — SIGNIFICANT CHANGE UP (ref 32–36)
MCV RBC AUTO: 87.5 FL — SIGNIFICANT CHANGE UP (ref 80–100)
PLATELET # BLD AUTO: 162 K/UL — SIGNIFICANT CHANGE UP (ref 150–400)
POTASSIUM SERPL-MCNC: 4 MMOL/L — SIGNIFICANT CHANGE UP (ref 3.5–5.3)
POTASSIUM SERPL-SCNC: 4 MMOL/L — SIGNIFICANT CHANGE UP (ref 3.5–5.3)
RBC # BLD: 4.64 M/UL — SIGNIFICANT CHANGE UP (ref 4.2–5.8)
RBC # FLD: 13.7 % — SIGNIFICANT CHANGE UP (ref 10.3–14.5)
SODIUM SERPL-SCNC: 142 MMOL/L — SIGNIFICANT CHANGE UP (ref 135–145)
WBC # BLD: 7.5 K/UL — SIGNIFICANT CHANGE UP (ref 3.8–10.5)
WBC # FLD AUTO: 7.5 K/UL — SIGNIFICANT CHANGE UP (ref 3.8–10.5)

## 2017-05-31 RX ORDER — APIXABAN 2.5 MG/1
1 TABLET, FILM COATED ORAL
Qty: 0 | Refills: 0 | COMMUNITY

## 2017-05-31 RX ORDER — ALBUTEROL 90 UG/1
3 AEROSOL, METERED ORAL
Qty: 0 | Refills: 0 | COMMUNITY

## 2017-05-31 RX ORDER — LABETALOL HCL 100 MG
20 TABLET ORAL ONCE
Qty: 0 | Refills: 0 | Status: COMPLETED | OUTPATIENT
Start: 2017-05-31 | End: 2017-05-31

## 2017-05-31 RX ORDER — MIDAZOLAM HYDROCHLORIDE 1 MG/ML
1 INJECTION, SOLUTION INTRAMUSCULAR; INTRAVENOUS ONCE
Qty: 0 | Refills: 0 | Status: DISCONTINUED | OUTPATIENT
Start: 2017-05-31 | End: 2017-05-31

## 2017-05-31 RX ORDER — RANOLAZINE 500 MG/1
1 TABLET, FILM COATED, EXTENDED RELEASE ORAL
Qty: 60 | Refills: 0 | OUTPATIENT
Start: 2017-05-31 | End: 2017-06-30

## 2017-05-31 RX ADMIN — Medication 20 MILLIGRAM(S): at 14:44

## 2017-05-31 RX ADMIN — MIDAZOLAM HYDROCHLORIDE 1 MILLIGRAM(S): 1 INJECTION, SOLUTION INTRAMUSCULAR; INTRAVENOUS at 14:45

## 2017-05-31 NOTE — PROGRESS NOTE ADULT - PROBLEM SELECTOR PLAN 1
-vitals signs, labs, diet and activity per post procedure orders  -ambulate ad isra post bed rest  -encouraged PO fluids  -plan of care discussed with patient, daughter and MD  -likely d/c to home with daughter later today   -post procedure and d/c instructions reviewed  -follow up with MD in 1-2 weeks  -Restart Eliquis tomorrow due to hematoma today.  - Start Ranexa 500mg PO BID  -Come back to stent LM when longer guide here.

## 2017-05-31 NOTE — PROGRESS NOTE ADULT - SUBJECTIVE AND OBJECTIVE BOX
80yo M with PMHx of CAD s/p CABG x 4 in 2005, COPD on chronic steroids, HTN, PAF on eliquis, s/p PPM. Pt c/o increasing chest pain on exertion since last Nov.   Positive PET scan for anterior ischemia.  Now s/p cath revealing LM stenosis extending to D1.  Unable to reach stenosis due to tortuous aorta.  Longer guide ordered. Attempted left femoral access and left radial access.  Femoral sheath pulled by RN in holding area after high BP controlled with Labetalol  and Versed. Pt had some difficulty urinating due to his BPH and bedrest.  He pushed to urinate and wound up having a large hematoma.  The hematoma was pushed out.  Hemostasis easily obtained.

## 2017-05-31 NOTE — PROGRESS NOTE ADULT - ASSESSMENT
PHYSICAL EXAM:    Constitutional: NAD,   Neuro: Alert and oriented x 3  Speech clear No focal deficits  Neck: No JVD No bruit  Respiratory: CTAB anteriorly  Cardiovascular: S1 and S2, RRR, no M/G/R  Paced rhythm  Gastrointestinal: BS+, soft, NT/ND  Extremities:  Chronic venous stasis changes to lower extremities.  Left groin puncture site now soft. No bleeding. Left radial band removed.  No bleeding or hematoma.  2+ radial pulses    Vascular: dopplerable  peripheral pulses  Psychiatric: Normal mood, normal affect  Musculoskeletal: 5/5 strength b/l upper and lower extremities

## 2017-06-12 DIAGNOSIS — L76.32 POSTPROCEDURAL HEMATOMA OF SKIN AND SUBCUTANEOUS TISSUE FOLLOWING OTHER PROCEDURE: ICD-10-CM

## 2017-06-12 DIAGNOSIS — Z95.1 PRESENCE OF AORTOCORONARY BYPASS GRAFT: ICD-10-CM

## 2017-06-12 DIAGNOSIS — J44.9 CHRONIC OBSTRUCTIVE PULMONARY DISEASE, UNSPECIFIED: ICD-10-CM

## 2017-06-12 DIAGNOSIS — R07.9 CHEST PAIN, UNSPECIFIED: ICD-10-CM

## 2017-06-12 DIAGNOSIS — N40.0 BENIGN PROSTATIC HYPERPLASIA WITHOUT LOWER URINARY TRACT SYMPTOMS: ICD-10-CM

## 2017-06-12 DIAGNOSIS — Z95.0 PRESENCE OF CARDIAC PACEMAKER: ICD-10-CM

## 2017-06-12 DIAGNOSIS — E78.5 HYPERLIPIDEMIA, UNSPECIFIED: ICD-10-CM

## 2017-06-12 DIAGNOSIS — Z87.891 PERSONAL HISTORY OF NICOTINE DEPENDENCE: ICD-10-CM

## 2017-06-12 DIAGNOSIS — Z79.52 LONG TERM (CURRENT) USE OF SYSTEMIC STEROIDS: ICD-10-CM

## 2017-06-12 DIAGNOSIS — I25.10 ATHEROSCLEROTIC HEART DISEASE OF NATIVE CORONARY ARTERY WITHOUT ANGINA PECTORIS: ICD-10-CM

## 2017-06-12 DIAGNOSIS — Z90.49 ACQUIRED ABSENCE OF OTHER SPECIFIED PARTS OF DIGESTIVE TRACT: ICD-10-CM

## 2017-06-12 DIAGNOSIS — I87.2 VENOUS INSUFFICIENCY (CHRONIC) (PERIPHERAL): ICD-10-CM

## 2017-06-12 DIAGNOSIS — Z79.01 LONG TERM (CURRENT) USE OF ANTICOAGULANTS: ICD-10-CM

## 2017-06-12 DIAGNOSIS — Z79.02 LONG TERM (CURRENT) USE OF ANTITHROMBOTICS/ANTIPLATELETS: ICD-10-CM

## 2017-06-12 DIAGNOSIS — I10 ESSENTIAL (PRIMARY) HYPERTENSION: ICD-10-CM

## 2017-06-12 DIAGNOSIS — I48.0 PAROXYSMAL ATRIAL FIBRILLATION: ICD-10-CM

## 2017-07-06 ENCOUNTER — RX RENEWAL (OUTPATIENT)
Age: 82
End: 2017-07-06

## 2017-07-07 ENCOUNTER — MEDICATION RENEWAL (OUTPATIENT)
Age: 82
End: 2017-07-07

## 2017-07-11 RX ORDER — AMLODIPINE BESYLATE 2.5 MG/1
0.5 TABLET ORAL
Qty: 0 | Refills: 0 | COMMUNITY

## 2017-07-11 RX ORDER — ALBUTEROL 90 UG/1
0 AEROSOL, METERED ORAL
Qty: 0 | Refills: 0 | COMMUNITY

## 2017-07-11 NOTE — ASU PATIENT PROFILE, ADULT - PMH
Atypical TB    Benign nodular prostatic hyperplasia, presence of lower urinary tract symptoms unspecified    Chronic obstructive pulmonary disease, unspecified COPD type    Chronic venous insufficiency    Coronary artery disease involving native coronary artery, angina presence unspecified, unspecified whether native or transplanted heart    PAF (paroxysmal atrial fibrillation)

## 2017-07-11 NOTE — ASU PATIENT PROFILE, ADULT - PSH
Pacemaker    S/P CABG (coronary artery bypass graft)    S/P colon resection    Status post lobectomy of lung

## 2017-07-13 NOTE — H&P ADULT - NSHPPHYSICALEXAM_GEN_ALL_CORE
NERVOUS SYSTEM:  Alert & Oriented X3, Good concentration  CHEST/LUNG: Clear to auscultation ,diminished, No rales, rhonchi, wheezing  HEART: afib, paced rhythm  ABDOMEN: Soft, Nontender, Nondistended; Bowel sounds present  EXTREMITIES:  + Peripheral Pulses, No clubbing, cyanosis, or edema  SKIN: No rashes or lesions

## 2017-07-13 NOTE — H&P ADULT - ASSESSMENT
This is a81y/o Male with PM Hx of CAD s/p CABG x 4 in 2005, COPD on chronic steroids, HTN, PAF on Eliquis, s/p PPM. Pt c/o increasing chest pain on exertion since last Nov. Positive PET scan for anterior ischemia.  S/P LHC in May 2017  revealing LM stenosis extending to D1.  Unable to reach stenosis due to tortuous aorta.  Pt presents today for PCI of LM  Patient presents now for an angiogram with possible intervention.  - risk/ benefits discussed  - informed consent obtained

## 2017-07-13 NOTE — H&P ADULT - HISTORY OF PRESENT ILLNESS
82yo M with PMHx of CAD s/p CABG x 4 in 2005, COPD on chronic steroids, HTN, PAF on Eliquis, s/p PPM. Pt c/o increasing chest pain on exertion since last Nov. Positive PET scan for anterior ischemia.  S/P LHC in May 2017  revealing LM stenosis extending to D1.  Unable to reach stenosis due to tortuous aorta.  Pt presents today for PCI of LM

## 2017-07-14 ENCOUNTER — TRANSCRIPTION ENCOUNTER (OUTPATIENT)
Age: 82
End: 2017-07-14

## 2017-07-14 ENCOUNTER — INPATIENT (INPATIENT)
Facility: HOSPITAL | Age: 82
LOS: 0 days | Discharge: HOME | DRG: 247 | End: 2017-07-15
Attending: INTERNAL MEDICINE | Admitting: INTERNAL MEDICINE
Payer: MEDICARE

## 2017-07-14 ENCOUNTER — OUTPATIENT (OUTPATIENT)
Dept: OUTPATIENT SERVICES | Facility: HOSPITAL | Age: 82
LOS: 1 days | Discharge: HOME | End: 2017-07-14

## 2017-07-14 VITALS
TEMPERATURE: 98 F | WEIGHT: 210.1 LBS | SYSTOLIC BLOOD PRESSURE: 176 MMHG | RESPIRATION RATE: 18 BRPM | HEART RATE: 68 BPM | HEIGHT: 72 IN | DIASTOLIC BLOOD PRESSURE: 101 MMHG | OXYGEN SATURATION: 100 %

## 2017-07-14 VITALS — TEMPERATURE: 98 F

## 2017-07-14 VITALS
HEART RATE: 62 BPM | DIASTOLIC BLOOD PRESSURE: 99 MMHG | OXYGEN SATURATION: 100 % | WEIGHT: 210.1 LBS | RESPIRATION RATE: 18 BRPM | SYSTOLIC BLOOD PRESSURE: 165 MMHG | TEMPERATURE: 98 F

## 2017-07-14 DIAGNOSIS — Z95.1 PRESENCE OF AORTOCORONARY BYPASS GRAFT: Chronic | ICD-10-CM

## 2017-07-14 DIAGNOSIS — Z90.49 ACQUIRED ABSENCE OF OTHER SPECIFIED PARTS OF DIGESTIVE TRACT: Chronic | ICD-10-CM

## 2017-07-14 DIAGNOSIS — Z90.2 ACQUIRED ABSENCE OF LUNG [PART OF]: Chronic | ICD-10-CM

## 2017-07-14 DIAGNOSIS — Z95.0 PRESENCE OF CARDIAC PACEMAKER: Chronic | ICD-10-CM

## 2017-07-14 DIAGNOSIS — I25.10 ATHEROSCLEROTIC HEART DISEASE OF NATIVE CORONARY ARTERY WITHOUT ANGINA PECTORIS: ICD-10-CM

## 2017-07-14 LAB
ANION GAP SERPL CALC-SCNC: 6 MMOL/L — SIGNIFICANT CHANGE UP (ref 5–17)
BUN SERPL-MCNC: 21 MG/DL — SIGNIFICANT CHANGE UP (ref 7–23)
CALCIUM SERPL-MCNC: 8.8 MG/DL — SIGNIFICANT CHANGE UP (ref 8.5–10.1)
CHLORIDE SERPL-SCNC: 106 MMOL/L — SIGNIFICANT CHANGE UP (ref 96–108)
CO2 SERPL-SCNC: 28 MMOL/L — SIGNIFICANT CHANGE UP (ref 22–31)
CREAT SERPL-MCNC: 0.87 MG/DL — SIGNIFICANT CHANGE UP (ref 0.5–1.3)
GLUCOSE SERPL-MCNC: 84 MG/DL — SIGNIFICANT CHANGE UP (ref 70–99)
HBA1C BLD-MCNC: 5.7 % — HIGH (ref 4–5.6)
HCT VFR BLD CALC: 38.6 % — LOW (ref 39–50)
HGB BLD-MCNC: 12.6 G/DL — LOW (ref 13–17)
MCHC RBC-ENTMCNC: 27.8 PG — SIGNIFICANT CHANGE UP (ref 27–34)
MCHC RBC-ENTMCNC: 32.6 GM/DL — SIGNIFICANT CHANGE UP (ref 32–36)
MCV RBC AUTO: 85.1 FL — SIGNIFICANT CHANGE UP (ref 80–100)
PLATELET # BLD AUTO: 190 K/UL — SIGNIFICANT CHANGE UP (ref 150–400)
POTASSIUM SERPL-MCNC: 4.3 MMOL/L — SIGNIFICANT CHANGE UP (ref 3.5–5.3)
POTASSIUM SERPL-SCNC: 4.3 MMOL/L — SIGNIFICANT CHANGE UP (ref 3.5–5.3)
RBC # BLD: 4.54 M/UL — SIGNIFICANT CHANGE UP (ref 4.2–5.8)
RBC # FLD: 14.9 % — HIGH (ref 10.3–14.5)
SODIUM SERPL-SCNC: 140 MMOL/L — SIGNIFICANT CHANGE UP (ref 135–145)
WBC # BLD: 7.5 K/UL — SIGNIFICANT CHANGE UP (ref 3.8–10.5)
WBC # FLD AUTO: 7.5 K/UL — SIGNIFICANT CHANGE UP (ref 3.8–10.5)

## 2017-07-14 PROCEDURE — 92928 PRQ TCAT PLMT NTRAC ST 1 LES: CPT | Mod: GC,LM

## 2017-07-14 PROCEDURE — 93010 ELECTROCARDIOGRAM REPORT: CPT

## 2017-07-14 RX ORDER — ALBUTEROL 90 UG/1
0 AEROSOL, METERED ORAL
Qty: 0 | Refills: 0 | COMMUNITY

## 2017-07-14 RX ORDER — LEVOTHYROXINE SODIUM 125 MCG
1 TABLET ORAL
Qty: 0 | Refills: 0 | COMMUNITY

## 2017-07-14 RX ORDER — PANTOPRAZOLE SODIUM 20 MG/1
40 TABLET, DELAYED RELEASE ORAL
Qty: 0 | Refills: 0 | Status: DISCONTINUED | OUTPATIENT
Start: 2017-07-14 | End: 2017-07-15

## 2017-07-14 RX ORDER — ASPIRIN/CALCIUM CARB/MAGNESIUM 324 MG
81 TABLET ORAL DAILY
Qty: 0 | Refills: 0 | Status: DISCONTINUED | OUTPATIENT
Start: 2017-07-14 | End: 2017-07-15

## 2017-07-14 RX ORDER — SIMVASTATIN 20 MG/1
20 TABLET, FILM COATED ORAL AT BEDTIME
Qty: 0 | Refills: 0 | Status: DISCONTINUED | OUTPATIENT
Start: 2017-07-14 | End: 2017-07-15

## 2017-07-14 RX ORDER — RANOLAZINE 500 MG/1
500 TABLET, FILM COATED, EXTENDED RELEASE ORAL
Qty: 0 | Refills: 0 | Status: DISCONTINUED | OUTPATIENT
Start: 2017-07-14 | End: 2017-07-15

## 2017-07-14 RX ORDER — ISOSORBIDE MONONITRATE 60 MG/1
1 TABLET, EXTENDED RELEASE ORAL
Qty: 0 | Refills: 0 | COMMUNITY

## 2017-07-14 RX ORDER — DOCUSATE SODIUM 100 MG
100 CAPSULE ORAL
Qty: 0 | Refills: 0 | Status: DISCONTINUED | OUTPATIENT
Start: 2017-07-14 | End: 2017-07-15

## 2017-07-14 RX ORDER — DOXAZOSIN MESYLATE 4 MG
4 TABLET ORAL AT BEDTIME
Qty: 0 | Refills: 0 | Status: DISCONTINUED | OUTPATIENT
Start: 2017-07-14 | End: 2017-07-15

## 2017-07-14 RX ORDER — SODIUM CHLORIDE 9 MG/ML
3 INJECTION INTRAMUSCULAR; INTRAVENOUS; SUBCUTANEOUS EVERY 8 HOURS
Qty: 0 | Refills: 0 | Status: DISCONTINUED | OUTPATIENT
Start: 2017-07-14 | End: 2017-07-15

## 2017-07-14 RX ORDER — CLOPIDOGREL BISULFATE 75 MG/1
1 TABLET, FILM COATED ORAL
Qty: 0 | Refills: 0 | COMMUNITY

## 2017-07-14 RX ORDER — POTASSIUM CHLORIDE 20 MEQ
0 PACKET (EA) ORAL
Qty: 0 | Refills: 0 | COMMUNITY

## 2017-07-14 RX ORDER — SIMVASTATIN 20 MG/1
1 TABLET, FILM COATED ORAL
Qty: 0 | Refills: 0 | COMMUNITY

## 2017-07-14 RX ORDER — APIXABAN 2.5 MG/1
1 TABLET, FILM COATED ORAL
Qty: 0 | Refills: 0 | COMMUNITY

## 2017-07-14 RX ORDER — AMLODIPINE BESYLATE 2.5 MG/1
5 TABLET ORAL DAILY
Qty: 0 | Refills: 0 | Status: DISCONTINUED | OUTPATIENT
Start: 2017-07-14 | End: 2017-07-15

## 2017-07-14 RX ORDER — CHOLECALCIFEROL (VITAMIN D3) 125 MCG
1 CAPSULE ORAL
Qty: 0 | Refills: 0 | COMMUNITY

## 2017-07-14 RX ORDER — APIXABAN 2.5 MG/1
2.5 TABLET, FILM COATED ORAL EVERY 12 HOURS
Qty: 0 | Refills: 0 | Status: DISCONTINUED | OUTPATIENT
Start: 2017-07-15 | End: 2017-07-15

## 2017-07-14 RX ORDER — HYDRALAZINE HCL 50 MG
5 TABLET ORAL ONCE
Qty: 0 | Refills: 0 | Status: DISCONTINUED | OUTPATIENT
Start: 2017-07-14 | End: 2017-07-15

## 2017-07-14 RX ORDER — FINASTERIDE 5 MG/1
5 TABLET, FILM COATED ORAL DAILY
Qty: 0 | Refills: 0 | Status: DISCONTINUED | OUTPATIENT
Start: 2017-07-14 | End: 2017-07-15

## 2017-07-14 RX ORDER — CLOPIDOGREL BISULFATE 75 MG/1
75 TABLET, FILM COATED ORAL DAILY
Qty: 0 | Refills: 0 | Status: DISCONTINUED | OUTPATIENT
Start: 2017-07-14 | End: 2017-07-15

## 2017-07-14 RX ORDER — OMEPRAZOLE 10 MG/1
1 CAPSULE, DELAYED RELEASE ORAL
Qty: 0 | Refills: 0 | COMMUNITY

## 2017-07-14 RX ORDER — LEVOTHYROXINE SODIUM 125 MCG
112 TABLET ORAL DAILY
Qty: 0 | Refills: 0 | Status: DISCONTINUED | OUTPATIENT
Start: 2017-07-14 | End: 2017-07-15

## 2017-07-14 RX ORDER — LISINOPRIL 2.5 MG/1
20 TABLET ORAL DAILY
Qty: 0 | Refills: 0 | Status: DISCONTINUED | OUTPATIENT
Start: 2017-07-14 | End: 2017-07-15

## 2017-07-14 RX ORDER — IPRATROPIUM BROMIDE 0.2 MG/ML
0 SOLUTION, NON-ORAL INHALATION
Qty: 0 | Refills: 0 | COMMUNITY

## 2017-07-14 RX ORDER — ISOSORBIDE MONONITRATE 60 MG/1
30 TABLET, EXTENDED RELEASE ORAL DAILY
Qty: 0 | Refills: 0 | Status: DISCONTINUED | OUTPATIENT
Start: 2017-07-14 | End: 2017-07-15

## 2017-07-14 RX ORDER — LISINOPRIL 2.5 MG/1
1 TABLET ORAL
Qty: 0 | Refills: 0 | COMMUNITY

## 2017-07-14 RX ORDER — FINASTERIDE 5 MG/1
1 TABLET, FILM COATED ORAL
Qty: 0 | Refills: 0 | COMMUNITY

## 2017-07-14 RX ORDER — CARVEDILOL PHOSPHATE 80 MG/1
1 CAPSULE, EXTENDED RELEASE ORAL
Qty: 0 | Refills: 0 | COMMUNITY

## 2017-07-14 RX ORDER — AMLODIPINE BESYLATE 2.5 MG/1
1 TABLET ORAL
Qty: 0 | Refills: 0 | COMMUNITY

## 2017-07-14 RX ORDER — SIMVASTATIN 20 MG/1
40 TABLET, FILM COATED ORAL AT BEDTIME
Qty: 0 | Refills: 0 | Status: DISCONTINUED | OUTPATIENT
Start: 2017-07-14 | End: 2017-07-14

## 2017-07-14 RX ORDER — ACETAMINOPHEN 500 MG
650 TABLET ORAL EVERY 6 HOURS
Qty: 0 | Refills: 0 | Status: DISCONTINUED | OUTPATIENT
Start: 2017-07-14 | End: 2017-07-15

## 2017-07-14 RX ORDER — CARVEDILOL PHOSPHATE 80 MG/1
6.25 CAPSULE, EXTENDED RELEASE ORAL EVERY 12 HOURS
Qty: 0 | Refills: 0 | Status: DISCONTINUED | OUTPATIENT
Start: 2017-07-14 | End: 2017-07-15

## 2017-07-14 RX ORDER — TERAZOSIN HYDROCHLORIDE 10 MG/1
1 CAPSULE ORAL
Qty: 0 | Refills: 0 | COMMUNITY

## 2017-07-14 RX ORDER — FUROSEMIDE 40 MG
20 TABLET ORAL DAILY
Qty: 0 | Refills: 0 | Status: DISCONTINUED | OUTPATIENT
Start: 2017-07-14 | End: 2017-07-15

## 2017-07-14 RX ORDER — IPRATROPIUM/ALBUTEROL SULFATE 18-103MCG
3 AEROSOL WITH ADAPTER (GRAM) INHALATION EVERY 6 HOURS
Qty: 0 | Refills: 0 | Status: DISCONTINUED | OUTPATIENT
Start: 2017-07-14 | End: 2017-07-15

## 2017-07-14 RX ORDER — ALBUTEROL 90 UG/1
2.5 AEROSOL, METERED ORAL ONCE
Qty: 0 | Refills: 0 | Status: COMPLETED | OUTPATIENT
Start: 2017-07-14 | End: 2017-07-14

## 2017-07-14 RX ADMIN — Medication 3 MILLILITER(S): at 22:47

## 2017-07-14 RX ADMIN — ALBUTEROL 2.5 MILLIGRAM(S): 90 AEROSOL, METERED ORAL at 10:29

## 2017-07-14 RX ADMIN — Medication 650 MILLIGRAM(S): at 21:47

## 2017-07-14 RX ADMIN — Medication 4 MILLIGRAM(S): at 22:44

## 2017-07-14 RX ADMIN — SODIUM CHLORIDE 3 MILLILITER(S): 9 INJECTION INTRAMUSCULAR; INTRAVENOUS; SUBCUTANEOUS at 22:48

## 2017-07-14 RX ADMIN — Medication 5 MILLIGRAM(S): at 22:44

## 2017-07-14 RX ADMIN — Medication 100 MILLIGRAM(S): at 22:44

## 2017-07-14 RX ADMIN — Medication 650 MILLIGRAM(S): at 17:50

## 2017-07-14 RX ADMIN — SIMVASTATIN 20 MILLIGRAM(S): 20 TABLET, FILM COATED ORAL at 22:44

## 2017-07-14 RX ADMIN — SODIUM CHLORIDE 3 MILLILITER(S): 9 INJECTION INTRAMUSCULAR; INTRAVENOUS; SUBCUTANEOUS at 19:00

## 2017-07-14 RX ADMIN — RANOLAZINE 500 MILLIGRAM(S): 500 TABLET, FILM COATED, EXTENDED RELEASE ORAL at 18:38

## 2017-07-14 RX ADMIN — Medication 3 MILLILITER(S): at 17:30

## 2017-07-14 NOTE — H&P CARDIOLOGY - PMH
Atypical TB    Benign nodular prostatic hyperplasia, presence of lower urinary tract symptoms unspecified    CAD (coronary artery disease)    Chronic obstructive pulmonary disease, unspecified COPD type    Chronic venous insufficiency    Coronary artery disease involving native coronary artery, angina presence unspecified, unspecified whether native or transplanted heart    PAF (paroxysmal atrial fibrillation)    Stented coronary artery

## 2017-07-14 NOTE — H&P CARDIOLOGY - HISTORY OF PRESENT ILLNESS
80 yo M with PMHx of CAD s/p CABG x 4 in 2005, COPD on chronic steroids, HTN, PAF on Eliquis(last dose 7/12), s/p PPM, right lobectomy 2/2 atypical TB who prsented to [facility] cath lab this am for elective PCI. Pt reports he has been experiencing increasing chest pain on exertion since 11/2017, positive PET scan for anterior ischemia and s/p LHC in 5/31/2017 revealing LM stenosis extending to D1. Unable to reach stenosis due to tortuous aorta at that time, now transfer here for possible laser PCI. On route 2 NTG SL given for chest pain 8/10, /98, HR 80 in ambulance, now denies chest pain or SOB. 82 yo M with PMHx of CAD s/p CABG x 4 in 2005, COPD on chronic steroids, HTN, PAF on Eliquis(last dose 7/12), s/p PPM, right lobectomy 2/2 atypical TB who prsented to [facility] cath lab this am for elective PCI. Pt reports he has been experiencing increasing chest pain on exertion since 11/2017, positive PET scan for anterior ischemia and s/p LHC in 5/31/2017 revealing LM stenosis extending to D1. Unable to reach stenosis due to tortuous aorta at that time, now transferred here for possible laser PCI. On route 2 NTG SL given for chest pain 8/10, /98, HR 80 in ambulance, now denies chest pain or SOB.

## 2017-07-14 NOTE — CHART NOTE - NSCHARTNOTEFT_GEN_A_CORE
Removal of 7 Fr Left Femoral Sheath    Pulses in the left lower extremity are audible by doppler. The patient was placed in the supine position. The insertion site was identified and the sutures were removed per protocol.  The 7 [place] femoral sheath was then removed. Direct pressure was applied for 20 minutes.     Monitoring of the left groin and both lower extremities including neuro-vascular checks and vital signs every 15 minutes x 4, then every 30 minutes x 2, then every 1 hour was ordered.    Complications: None/Other    Viet Balderas  PGY-4  [phone]

## 2017-07-15 VITALS
RESPIRATION RATE: 18 BRPM | HEART RATE: 64 BPM | SYSTOLIC BLOOD PRESSURE: 139 MMHG | OXYGEN SATURATION: 99 % | DIASTOLIC BLOOD PRESSURE: 75 MMHG | TEMPERATURE: 98 F

## 2017-07-15 DIAGNOSIS — I48.0 PAROXYSMAL ATRIAL FIBRILLATION: ICD-10-CM

## 2017-07-15 DIAGNOSIS — I25.118 ATHEROSCLEROTIC HEART DISEASE OF NATIVE CORONARY ARTERY WITH OTHER FORMS OF ANGINA PECTORIS: ICD-10-CM

## 2017-07-15 DIAGNOSIS — E78.5 HYPERLIPIDEMIA, UNSPECIFIED: ICD-10-CM

## 2017-07-15 DIAGNOSIS — I10 ESSENTIAL (PRIMARY) HYPERTENSION: ICD-10-CM

## 2017-07-15 LAB
ANION GAP SERPL CALC-SCNC: 12 MMOL/L — SIGNIFICANT CHANGE UP (ref 5–17)
BASOPHILS # BLD AUTO: 0 K/UL — SIGNIFICANT CHANGE UP (ref 0–0.2)
BASOPHILS NFR BLD AUTO: 0.3 % — SIGNIFICANT CHANGE UP (ref 0–2)
BUN SERPL-MCNC: 22 MG/DL — SIGNIFICANT CHANGE UP (ref 7–23)
CALCIUM SERPL-MCNC: 8.8 MG/DL — SIGNIFICANT CHANGE UP (ref 8.4–10.5)
CHLORIDE SERPL-SCNC: 103 MMOL/L — SIGNIFICANT CHANGE UP (ref 96–108)
CO2 SERPL-SCNC: 26 MMOL/L — SIGNIFICANT CHANGE UP (ref 22–31)
CREAT SERPL-MCNC: 0.8 MG/DL — SIGNIFICANT CHANGE UP (ref 0.5–1.3)
EOSINOPHIL # BLD AUTO: 0 K/UL — SIGNIFICANT CHANGE UP (ref 0–0.5)
EOSINOPHIL NFR BLD AUTO: 0.6 % — SIGNIFICANT CHANGE UP (ref 0–6)
GLUCOSE SERPL-MCNC: 102 MG/DL — HIGH (ref 70–99)
HCT VFR BLD CALC: 36.7 % — LOW (ref 39–50)
HGB BLD-MCNC: 12.2 G/DL — LOW (ref 13–17)
LYMPHOCYTES # BLD AUTO: 1 K/UL — SIGNIFICANT CHANGE UP (ref 1–3.3)
LYMPHOCYTES # BLD AUTO: 14.2 % — SIGNIFICANT CHANGE UP (ref 13–44)
MCHC RBC-ENTMCNC: 29.5 PG — SIGNIFICANT CHANGE UP (ref 27–34)
MCHC RBC-ENTMCNC: 33.3 GM/DL — SIGNIFICANT CHANGE UP (ref 32–36)
MCV RBC AUTO: 88.7 FL — SIGNIFICANT CHANGE UP (ref 80–100)
MONOCYTES # BLD AUTO: 0.8 K/UL — SIGNIFICANT CHANGE UP (ref 0–0.9)
MONOCYTES NFR BLD AUTO: 11.2 % — SIGNIFICANT CHANGE UP (ref 2–14)
NEUTROPHILS # BLD AUTO: 5.4 K/UL — SIGNIFICANT CHANGE UP (ref 1.8–7.4)
NEUTROPHILS NFR BLD AUTO: 73.7 % — SIGNIFICANT CHANGE UP (ref 43–77)
PLATELET # BLD AUTO: 149 K/UL — LOW (ref 150–400)
POTASSIUM SERPL-MCNC: 3.5 MMOL/L — SIGNIFICANT CHANGE UP (ref 3.5–5.3)
POTASSIUM SERPL-SCNC: 3.5 MMOL/L — SIGNIFICANT CHANGE UP (ref 3.5–5.3)
RBC # BLD: 4.14 M/UL — LOW (ref 4.2–5.8)
RBC # FLD: 14.7 % — HIGH (ref 10.3–14.5)
SODIUM SERPL-SCNC: 141 MMOL/L — SIGNIFICANT CHANGE UP (ref 135–145)
WBC # BLD: 7.4 K/UL — SIGNIFICANT CHANGE UP (ref 3.8–10.5)
WBC # FLD AUTO: 7.4 K/UL — SIGNIFICANT CHANGE UP (ref 3.8–10.5)

## 2017-07-15 PROCEDURE — C1887: CPT

## 2017-07-15 PROCEDURE — 83036 HEMOGLOBIN GLYCOSYLATED A1C: CPT

## 2017-07-15 PROCEDURE — C9600: CPT | Mod: LM

## 2017-07-15 PROCEDURE — C1725: CPT

## 2017-07-15 PROCEDURE — 93005 ELECTROCARDIOGRAM TRACING: CPT

## 2017-07-15 PROCEDURE — 80048 BASIC METABOLIC PNL TOTAL CA: CPT

## 2017-07-15 PROCEDURE — 85027 COMPLETE CBC AUTOMATED: CPT

## 2017-07-15 PROCEDURE — C1894: CPT

## 2017-07-15 PROCEDURE — 93010 ELECTROCARDIOGRAM REPORT: CPT

## 2017-07-15 PROCEDURE — 94640 AIRWAY INHALATION TREATMENT: CPT

## 2017-07-15 PROCEDURE — C1769: CPT

## 2017-07-15 PROCEDURE — C1874: CPT

## 2017-07-15 RX ORDER — DOCUSATE SODIUM 100 MG
1 CAPSULE ORAL
Qty: 0 | Refills: 0 | COMMUNITY
Start: 2017-07-15

## 2017-07-15 RX ORDER — ASPIRIN/CALCIUM CARB/MAGNESIUM 324 MG
1 TABLET ORAL
Qty: 0 | Refills: 0 | COMMUNITY

## 2017-07-15 RX ORDER — ASPIRIN/CALCIUM CARB/MAGNESIUM 324 MG
1 TABLET ORAL
Qty: 0 | Refills: 0 | COMMUNITY
Start: 2017-07-15

## 2017-07-15 RX ORDER — ACETAMINOPHEN 500 MG
2 TABLET ORAL
Qty: 0 | Refills: 0 | COMMUNITY
Start: 2017-07-15

## 2017-07-15 RX ORDER — POTASSIUM BICARBONATE 978 MG/1
25 TABLET, EFFERVESCENT ORAL ONCE
Qty: 0 | Refills: 0 | Status: COMPLETED | OUTPATIENT
Start: 2017-07-15 | End: 2017-07-15

## 2017-07-15 RX ADMIN — Medication 81 MILLIGRAM(S): at 06:11

## 2017-07-15 RX ADMIN — Medication 10 MILLIGRAM(S): at 06:10

## 2017-07-15 RX ADMIN — SODIUM CHLORIDE 3 MILLILITER(S): 9 INJECTION INTRAMUSCULAR; INTRAVENOUS; SUBCUTANEOUS at 08:06

## 2017-07-15 RX ADMIN — LISINOPRIL 20 MILLIGRAM(S): 2.5 TABLET ORAL at 06:10

## 2017-07-15 RX ADMIN — POTASSIUM BICARBONATE 25 MILLIEQUIVALENT(S): 978 TABLET, EFFERVESCENT ORAL at 06:09

## 2017-07-15 RX ADMIN — CARVEDILOL PHOSPHATE 6.25 MILLIGRAM(S): 80 CAPSULE, EXTENDED RELEASE ORAL at 06:10

## 2017-07-15 RX ADMIN — AMLODIPINE BESYLATE 5 MILLIGRAM(S): 2.5 TABLET ORAL at 06:11

## 2017-07-15 RX ADMIN — APIXABAN 2.5 MILLIGRAM(S): 2.5 TABLET, FILM COATED ORAL at 06:11

## 2017-07-15 RX ADMIN — PANTOPRAZOLE SODIUM 40 MILLIGRAM(S): 20 TABLET, DELAYED RELEASE ORAL at 06:10

## 2017-07-15 RX ADMIN — Medication 20 MILLIGRAM(S): at 06:11

## 2017-07-15 RX ADMIN — CLOPIDOGREL BISULFATE 75 MILLIGRAM(S): 75 TABLET, FILM COATED ORAL at 06:10

## 2017-07-15 RX ADMIN — RANOLAZINE 500 MILLIGRAM(S): 500 TABLET, FILM COATED, EXTENDED RELEASE ORAL at 06:10

## 2017-07-15 RX ADMIN — Medication 100 MILLIGRAM(S): at 06:10

## 2017-07-15 NOTE — DISCHARGE NOTE ADULT - HOSPITAL COURSE
HPI:  82 yo M with PMHx of CAD s/p CABG x 4 in 2005, COPD on chronic steroids, HTN, PAF on Eliquis (last dose 7/12), s/p PPM, right lobectomy 2/2 atypical TB who prsented to [facility] cath lab this am for elective PCI. Pt reports he has been experiencing increasing chest pain on exertion since 11/2017, positive PET scan for anterior ischemia and s/p LHC in 5/31/2017 revealing LM stenosis extending to D1. Unable to reach stenosis due to tortuous aorta at that time, now transferred here for possible laser PCI. On route 2 NTG SL given for chest pain 8/10, /98, HR 80 in ambulance, now denies chest pain or SOB. (14 Jul 2017 12:04)    7/14 cardiac cath with 2 stents to the left main. Left groin and right radial insertion sites without swelling, bleeding.

## 2017-07-15 NOTE — PROGRESS NOTE ADULT - SUBJECTIVE AND OBJECTIVE BOX
Patient with 9 beats WCT on telemetry at 0242 while in bed, asymptomatic, no complaints. PMHX pacemaker, remains sinus rhythm 1st degree 60 bpm, will continue to monitor.

## 2017-07-15 NOTE — DISCHARGE NOTE ADULT - PATIENT PORTAL LINK FT
“You can access the Follow[employer] Patient Portal, offered by [facility], by registering with the following website: [email or web address]”

## 2017-07-15 NOTE — PROGRESS NOTE ADULT - ATTENDING COMMENTS
Judson Sprague, [facility]    [phone]
Judson Sprague, [facility]    [phone]

## 2017-07-15 NOTE — DISCHARGE NOTE ADULT - SECONDARY DIAGNOSIS.
PAF (paroxysmal atrial fibrillation) HTN (hypertension), benign Hyperlipidemia, unspecified hyperlipidemia type

## 2017-07-15 NOTE — PROGRESS NOTE ADULT - SUBJECTIVE AND OBJECTIVE BOX
Patient is a 81y old  Male who presents with a chief complaint of s/p c.cath with PCI (15 Jul 2017 00:24)          Allergies    No Known Allergies    Intolerances        Medications:  sodium chloride 0.9% lock flush 3 milliLiter(s) IV Push every 8 hours  finasteride 5 milliGRAM(s) Oral daily  predniSONE   Tablet 10 milliGRAM(s) Oral daily  predniSONE   Tablet 5 milliGRAM(s) Oral at bedtime  aspirin enteric coated 81 milliGRAM(s) Oral daily  lisinopril 20 milliGRAM(s) Oral daily  doxazosin 4 milliGRAM(s) Oral at bedtime  ranolazine 500 milliGRAM(s) Oral two times a day  isosorbide   mononitrate ER Tablet (IMDUR) 30 milliGRAM(s) Oral daily  clopidogrel Tablet 75 milliGRAM(s) Oral daily  carvedilol 6.25 milliGRAM(s) Oral every 12 hours  amLODIPine   Tablet 5 milliGRAM(s) Oral daily  furosemide    Tablet 20 milliGRAM(s) Oral daily  pantoprazole    Tablet 40 milliGRAM(s) Oral before breakfast  levothyroxine 112 MICROGram(s) Oral daily  apixaban 2.5 milliGRAM(s) Oral every 12 hours  hydrALAZINE Injectable 5 milliGRAM(s) IV Push once  simvastatin 20 milliGRAM(s) Oral at bedtime  ALBUTerol/ipratropium for Nebulization 3 milliLiter(s) Nebulizer every 6 hours PRN  acetaminophen   Tablet. 650 milliGRAM(s) Oral every 6 hours PRN  docusate sodium 100 milliGRAM(s) Oral two times a day  potassium bicarbonate/potassium citrate 25 milliEquivalent(s) Oral once      Vitals:  T(C): 36.4 (07-15-17 @ 04:39), Max: 36.8 (17 @ 12:04)  HR: 63 (07-15-17 @ 04:39) (59 - 92)  BP: 143/69 (07-15-17 @ 04:39) (133/68 - 176/101)  BP(mean): 126 (17 @ 12:04) (126 - 126)  RR: 17 (07-15-17 @ 04:39) (17 - 18)  SpO2: 94% (07-15-17 @ 04:39) (94% - 100%)  Wt(kg): --  Daily Height in cm: 182.88 (2017 12:04)    Daily Weight in k.3 (2017 12:04)  I&O's Summary        Physical Exam:  Appearance: Normal  Eyes: PERRL, EOMI  HENT: Normal oral [place], NC/AT  Cardiovascular: S1S2, RRR, No M/R/G, no JVD, No Lower extremity edema  Procedural Access Site: No hematoma, Non-tender to palpation, 2+ pulse, No bruit, No Ecchymosis  Respiratory: Clear to auscultation bilaterally  Gastrointestinal: Soft, Non tender, Normal Bowel Sounds  Musculoskeletal: No clubbing, No joint deformity   Neurologic: Non-focal  Lymphatic: No lymphadenopathy  Psychiatry: AAOx3, Mood & affect appropriate  Skin: No rashes, No ecchymoses, No cyanosis    07-15    141  |  103  |  22  ----------------------------<  102<H>  3.5   |  26  |  0.80    Ca    8.8      15 Jul 2017 05:06              Lipid panel   Hgb A1c Hemoglobin A1C, Whole Blood: 5.7 % ( @ 21:00)                          12.2   7.4   )-----------( 149      ( 15 Jul 2017 05:06 )             36.7         ECG: Apaced 60 bpm    Cath: 2 left main stents    Imaging:    Interpretation of Telemetry: SR 1st degree Apaced 60 bpm Patient is a 81y old  Male who presents with a chief complaint of s/p c.cath with PCI (15 Jul 2017 00:24)          Allergies    No Known Allergies    Intolerances        Medications:  sodium chloride 0.9% lock flush 3 milliLiter(s) IV Push every 8 hours  finasteride 5 milliGRAM(s) Oral daily  predniSONE   Tablet 10 milliGRAM(s) Oral daily  predniSONE   Tablet 5 milliGRAM(s) Oral at bedtime  aspirin enteric coated 81 milliGRAM(s) Oral daily  lisinopril 20 milliGRAM(s) Oral daily  doxazosin 4 milliGRAM(s) Oral at bedtime  ranolazine 500 milliGRAM(s) Oral two times a day  isosorbide   mononitrate ER Tablet (IMDUR) 30 milliGRAM(s) Oral daily  clopidogrel Tablet 75 milliGRAM(s) Oral daily  carvedilol 6.25 milliGRAM(s) Oral every 12 hours  amLODIPine   Tablet 5 milliGRAM(s) Oral daily  furosemide    Tablet 20 milliGRAM(s) Oral daily  pantoprazole    Tablet 40 milliGRAM(s) Oral before breakfast  levothyroxine 112 MICROGram(s) Oral daily  apixaban 2.5 milliGRAM(s) Oral every 12 hours  hydrALAZINE Injectable 5 milliGRAM(s) IV Push once  simvastatin 20 milliGRAM(s) Oral at bedtime  ALBUTerol/ipratropium for Nebulization 3 milliLiter(s) Nebulizer every 6 hours PRN  acetaminophen   Tablet. 650 milliGRAM(s) Oral every 6 hours PRN  docusate sodium 100 milliGRAM(s) Oral two times a day  potassium bicarbonate/potassium citrate 25 milliEquivalent(s) Oral once      Vitals:  T(C): 36.4 (07-15-17 @ 04:39), Max: 36.8 (17 @ 12:04)  HR: 63 (07-15-17 @ 04:39) (59 - 92)  BP: 143/69 (07-15-17 @ 04:39) (133/68 - 176/101)  BP(mean): 126 (17 @ 12:04) (126 - 126)  RR: 17 (07-15-17 @ 04:39) (17 - 18)  SpO2: 94% (07-15-17 @ 04:39) (94% - 100%)  Wt(kg): --  Daily Height in cm: 182.88 (2017 12:04)    Daily Weight in k.3 (2017 12:04)  I&O's Summary        Physical Exam:  Appearance: Normal  Eyes: PERRL, EOMI  HENT: Normal oral [place], NC/AT  Cardiovascular: S1S2, RRR, No M/R/G, no JVD, No Lower extremity edema  Procedural Access Site: No hematoma, Non-tender to palpation, 2+ pulse, No bruit, No Ecchymosis  Respiratory: Clear to auscultation bilaterally  Gastrointestinal: Soft, Non tender, Normal Bowel Sounds  Musculoskeletal: No clubbing, No joint deformity   Neurologic: Non-focal  Lymphatic: No lymphadenopathy  Psychiatry: AAOx3, Mood & affect appropriate  Skin: No rashes, No ecchymoses, No cyanosis    07-15    141  |  103  |  22  ----------------------------<  102<H>  [place] 25 meq PO x1 dose  3.5   |  26  |  0.80    Ca    8.8      15 Jul 2017 05:06              Lipid panel   Hgb A1c Hemoglobin A1C, Whole Blood: 5.7 % ( @ 21:00)                          12.2   7.4   )-----------( 149      ( 15 Jul 2017 05:06 )             36.7         ECG: Apaced 60 bpm    Cath: 2 left main stents    Imaging:    Interpretation of Telemetry: SR 1st degree Apaced 60 bpm

## 2017-07-15 NOTE — DISCHARGE NOTE ADULT - PLAN OF CARE
Patient remains chest pain free and understands post cath discharge instructions. No heavy lifting, strenuous activity, bending, straining, or unnecessary stair climbing for 2 weeks. No driving for 2 days. You may shower 24 hours following the procedure but avoid baths/swimming for 1 week. Check your groin site for bleeding and/or swelling daily following procedure and call your doctor immediately if it occurs or if you experience increased pain at the site. Follow up with your cardiologist in 1-2 weeks. You may call [facility] if you have any questions/concerns regarding your procedure [phone]. No heavy lifting or pushing/pulling with procedure arm for 2 weeks. No driving for 2 days. You may shower 24 hours following the procedure but avoid baths/swimming for 1 week. Check your wrist site for bleeding and/or swelling daily following procedure and call your doctor immediately if it occurs or if you experience increased pain at the site. Follow up with your cardiologist in 1-2 weeks. You may call [facility] if you have any questions/concerns regarding your procedure [phone]. Do not stop you Aspirin or Plavix unless instructed to do so by your cardiologist. Your heart rate and rhythm will be controlled. Continue with your cardiologist and primary care MD. Continue your current medications. Call your physician for palpitations, feelings of rapid heart beat, lightheadedness, or dizziness. Continue taking your Eliquis as prescribed. Your blood pressure will be controlled. Continue with your blood pressure medications; eat a heart healthy diet with low salt diet; exercise regularly (consult with your physician or cardiologist first); maintain a heart healthy weight; if you smoke - quit (A resource to help you stop smoking is the [facility] – phone number [phone].); include healthy ways to manage stress. Continue to follow with your primary care physician or cardiologist. Your LDL cholesterol will be less than 70mg/dL Continue with your cholesterol medications. Eat a heart healthy diet that is low in saturated fats and salt, and includes whole grains, fruits, vegetables and lean protein; exercise regularly (consult with your physician or cardiologist first); maintain a heart healthy weight; if you smoke - quit (A resource to help you stop smoking is the [facility] – phone number [phone].). Continue to follow with your primary physician or cardiologist.

## 2017-07-15 NOTE — DISCHARGE NOTE ADULT - CARE PROVIDER_API CALL
Vikas Wiseman (MD), Cardiovascular Disease; Internal Medicine; Interventional Cardiology  [address] [place] [address]  Phone: [phone]  Fax: [phone]

## 2017-07-15 NOTE — DISCHARGE NOTE ADULT - MEDICATION SUMMARY - MEDICATIONS TO TAKE
I will START or STAY ON the medications listed below when I get home from the hospital:    finasteride 5 mg oral tablet  -- 1 tab(s) by mouth once a day  -- Indication: For prostate    predniSONE 5 mg oral delayed release tablet  --  by mouth once a day (at bedtime)  -- 10mg in a.m. and 5mg in p.m.  -- Indication: For COPD    predniSONE 10 mg oral tablet  -- 1 tab(s) by mouth once a day AM  -- Indication: For COPD    acetaminophen 325 mg oral tablet  -- 2 tab(s) by mouth every 6 hours, As needed, Moderate Pain (4 - 6)  -- Indication: For mild pain    aspirin 81 mg oral delayed release tablet  -- 1 tab(s) by mouth once a day  -- Indication: For Stented coronary artery    lisinopril 20 mg oral tablet  -- 1 tab(s) by mouth once a day  -- Indication: For hypertension    terazosin 5 mg oral capsule  -- 1 cap(s) by mouth once a day (at bedtime)  -- Indication: For hypertension    isosorbide mononitrate 30 mg oral tablet, extended release  -- 1 tab(s) by mouth once a day (in the morning)  -- Indication: For hypertension    Ranexa 500 mg oral tablet, extended release  -- 1 tab(s) by mouth 2 times a day  -- Avoid grapefruit and grapefruit juice while taking this medication.  May cause drowsiness or dizziness.  Obtain medical advice before taking any non-prescription drugs as some may affect the action of this medication.  Swallow whole.  Do not crush.  This drug may impair the ability to drive or operate machinery.  Use care until you become familiar with its effects.    -- Indication: For hypertension    Eliquis 2.5 mg oral tablet  -- 1 tab(s) by mouth 2 times a day   last 7/12  -- Indication: For Atrial fibrillation    Zocor 20 mg oral tablet  -- 1 tab(s) by mouth once a day (at bedtime)  -- Indication: For hyperlipidemia    Plavix 75 mg oral tablet  -- 1 tab(s) by mouth once a day  -- Indication: For Stented coronary artery    Coreg 6.25 mg oral tablet  -- 1 tab(s) by mouth 2 times a day  -- Indication: For hypertension    ipratropium  --  inhaled 3 times a day, As Needed  -- Indication: For COPD    albuterol 2.5 mg/3 mL (0.083%) inhalation solution  --  inhaled once a day  -- Indication: For COPD    Norvasc 5 mg oral tablet  -- 1 tab(s) by mouth once a day  -- Indication: For hypertension    furosemide 20 mg oral tablet  -- 1 tab(s) by mouth once a day  -- Indication: For hypertension    docusate sodium 100 mg oral capsule  -- 1 cap(s) by mouth 2 times a day  -- Indication: For Stool softener    Klor-Con M20 oral tablet, extended release  --  by mouth once a day  -- Indication: For potassium supplement    omeprazole 40 mg oral delayed release capsule  -- 1 cap(s) by mouth once a day  -- Indication: For reflux    Synthroid 112 mcg (0.112 mg) oral tablet  -- 1 tab(s) by mouth once a day  -- Indication: For hypothyroid    Vitamin D3 1000 intl units oral capsule  -- 1 cap(s) by mouth once a day  -- Indication: For vitamin

## 2017-07-15 NOTE — DISCHARGE NOTE ADULT - CARE PLAN
Principal Discharge DX:	Coronary artery disease of native artery of native heart with stable angina pectoris  Goal:	Patient remains chest pain free and understands post cath discharge instructions.  Instructions for follow-up, activity and diet:	No heavy lifting, strenuous activity, bending, straining, or unnecessary stair climbing for 2 weeks. No driving for 2 days. You may shower 24 hours following the procedure but avoid baths/swimming for 1 week. Check your groin site for bleeding and/or swelling daily following procedure and call your doctor immediately if it occurs or if you experience increased pain at the site. Follow up with your cardiologist in 1-2 weeks. You may call [facility] if you have any questions/concerns regarding your procedure [phone]. No heavy lifting or pushing/pulling with procedure arm for 2 weeks. No driving for 2 days. You may shower 24 hours following the procedure but avoid baths/swimming for 1 week. Check your wrist site for bleeding and/or swelling daily following procedure and call your doctor immediately if it occurs or if you experience increased pain at the site. Follow up with your cardiologist in 1-2 weeks. You may call [facility] if you have any questions/concerns regarding your procedure [phone]. Do not stop you Aspirin or Plavix unless instructed to do so by your cardiologist.  Secondary Diagnosis:	PAF (paroxysmal atrial fibrillation)  Goal:	Your heart rate and rhythm will be controlled.  Instructions for follow-up, activity and diet:	Continue with your cardiologist and primary care MD. Continue your current medications. Call your physician for palpitations, feelings of rapid heart beat, lightheadedness, or dizziness. Continue taking your Eliquis as prescribed.  Secondary Diagnosis:	HTN (hypertension), benign  Goal:	Your blood pressure will be controlled.  Instructions for follow-up, activity and diet:	Continue with your blood pressure medications; eat a heart healthy diet with low salt diet; exercise regularly (consult with your physician or cardiologist first); maintain a heart healthy weight; if you smoke - quit (A resource to help you stop smoking is the [facility] – phone number [phone].); include healthy ways to manage stress. Continue to follow with your primary care physician or cardiologist.  Secondary Diagnosis:	Hyperlipidemia, unspecified hyperlipidemia type  Goal:	Your LDL cholesterol will be less than 70mg/dL  Instructions for follow-up, activity and diet:	Continue with your cholesterol medications. Eat a heart healthy diet that is low in saturated fats and salt, and includes whole grains, fruits, vegetables and lean protein; exercise regularly (consult with your physician or cardiologist first); maintain a heart healthy weight; if you smoke - quit (A resource to help you stop smoking is the [facility] Tobacco [facility] – phone number [phone].). Continue to follow with your primary physician or cardiologist. Principal Discharge DX:	Coronary artery disease of native artery of native heart with stable angina pectoris  Goal:	Patient remains chest pain free and understands post cath discharge instructions.  Instructions for follow-up, activity and diet:	No heavy lifting, strenuous activity, bending, straining, or unnecessary stair climbing for 2 weeks. No driving for 2 days. You may shower 24 hours following the procedure but avoid baths/swimming for 1 week. Check your groin site for bleeding and/or swelling daily following procedure and call your doctor immediately if it occurs or if you experience increased pain at the site. Follow up with your cardiologist in 1-2 weeks. You may call [facility] if you have any questions/concerns regarding your procedure [phone]. No heavy lifting or pushing/pulling with procedure arm for 2 weeks. No driving for 2 days. You may shower 24 hours following the procedure but avoid baths/swimming for 1 week. Check your wrist site for bleeding and/or swelling daily following procedure and call your doctor immediately if it occurs or if you experience increased pain at the site. Follow up with your cardiologist in 1-2 weeks. You may call [facility] if you have any questions/concerns regarding your procedure [phone]. Do not stop you Aspirin or Plavix unless instructed to do so by your cardiologist.  Secondary Diagnosis:	PAF (paroxysmal atrial fibrillation)  Goal:	Your heart rate and rhythm will be controlled.  Instructions for follow-up, activity and diet:	Continue with your cardiologist and primary care MD. Continue your current medications. Call your physician for palpitations, feelings of rapid heart beat, lightheadedness, or dizziness. Continue taking your Eliquis as prescribed.  Secondary Diagnosis:	HTN (hypertension), benign  Goal:	Your blood pressure will be controlled.  Instructions for follow-up, activity and diet:	Continue with your blood pressure medications; eat a heart healthy diet with low salt diet; exercise regularly (consult with your physician or cardiologist first); maintain a heart healthy weight; if you smoke - quit (A resource to help you stop smoking is the [facility] Tobacco [facility] – phone number [phone].); include healthy ways to manage stress. Continue to follow with your primary care physician or cardiologist.  Secondary Diagnosis:	Hyperlipidemia, unspecified hyperlipidemia type  Goal:	Your LDL cholesterol will be less than 70mg/dL  Instructions for follow-up, activity and diet:	Continue with your cholesterol medications. Eat a heart healthy diet that is low in saturated fats and salt, and includes whole grains, fruits, vegetables and lean protein; exercise regularly (consult with your physician or cardiologist first); maintain a heart healthy weight; if you smoke - quit (A resource to help you stop smoking is the [facility] Tobacco [facility] – phone number [phone].). Continue to follow with your primary physician or cardiologist. Principal Discharge DX:	Coronary artery disease of native artery of native heart with stable angina pectoris  Goal:	Patient remains chest pain free and understands post cath discharge instructions.  Instructions for follow-up, activity and diet:	No heavy lifting, strenuous activity, bending, straining, or unnecessary stair climbing for 2 weeks. No driving for 2 days. You may shower 24 hours following the procedure but avoid baths/swimming for 1 week. Check your groin site for bleeding and/or swelling daily following procedure and call your doctor immediately if it occurs or if you experience increased pain at the site. Follow up with your cardiologist in 1-2 weeks. You may call [facility] if you have any questions/concerns regarding your procedure [phone]. No heavy lifting or pushing/pulling with procedure arm for 2 weeks. No driving for 2 days. You may shower 24 hours following the procedure but avoid baths/swimming for 1 week. Check your wrist site for bleeding and/or swelling daily following procedure and call your doctor immediately if it occurs or if you experience increased pain at the site. Follow up with your cardiologist in 1-2 weeks. You may call [facility] if you have any questions/concerns regarding your procedure [phone]. Do not stop you Aspirin or Plavix unless instructed to do so by your cardiologist.  Secondary Diagnosis:	PAF (paroxysmal atrial fibrillation)  Goal:	Your heart rate and rhythm will be controlled.  Instructions for follow-up, activity and diet:	Continue with your cardiologist and primary care MD. Continue your current medications. Call your physician for palpitations, feelings of rapid heart beat, lightheadedness, or dizziness. Continue taking your Eliquis as prescribed.  Secondary Diagnosis:	HTN (hypertension), benign  Goal:	Your blood pressure will be controlled.  Instructions for follow-up, activity and diet:	Continue with your blood pressure medications; eat a heart healthy diet with low salt diet; exercise regularly (consult with your physician or cardiologist first); maintain a heart healthy weight; if you smoke - quit (A resource to help you stop smoking is the [facility] – phone number [phone].); include healthy ways to manage stress. Continue to follow with your primary care physician or cardiologist.  Secondary Diagnosis:	Hyperlipidemia, unspecified hyperlipidemia type  Goal:	Your LDL cholesterol will be less than 70mg/dL  Instructions for follow-up, activity and diet:	Continue with your cholesterol medications. Eat a heart healthy diet that is low in saturated fats and salt, and includes whole grains, fruits, vegetables and lean protein; exercise regularly (consult with your physician or cardiologist first); maintain a heart healthy weight; if you smoke - quit (A resource to help you stop smoking is the [facility] – phone number [phone].). Continue to follow with your primary physician or cardiologist. Principal Discharge DX:	Coronary artery disease of native artery of native heart with stable angina pectoris  Goal:	Patient remains chest pain free and understands post cath discharge instructions.  Instructions for follow-up, activity and diet:	No heavy lifting, strenuous activity, bending, straining, or unnecessary stair climbing for 2 weeks. No driving for 2 days. You may shower 24 hours following the procedure but avoid baths/swimming for 1 week. Check your groin site for bleeding and/or swelling daily following procedure and call your doctor immediately if it occurs or if you experience increased pain at the site. Follow up with your cardiologist in 1-2 weeks. You may call [facility] if you have any questions/concerns regarding your procedure [phone]. No heavy lifting or pushing/pulling with procedure arm for 2 weeks. No driving for 2 days. You may shower 24 hours following the procedure but avoid baths/swimming for 1 week. Check your wrist site for bleeding and/or swelling daily following procedure and call your doctor immediately if it occurs or if you experience increased pain at the site. Follow up with your cardiologist in 1-2 weeks. You may call [facility] if you have any questions/concerns regarding your procedure [phone]. Do not stop you Aspirin or Plavix unless instructed to do so by your cardiologist.  Secondary Diagnosis:	PAF (paroxysmal atrial fibrillation)  Goal:	Your heart rate and rhythm will be controlled.  Instructions for follow-up, activity and diet:	Continue with your cardiologist and primary care MD. Continue your current medications. Call your physician for palpitations, feelings of rapid heart beat, lightheadedness, or dizziness. Continue taking your Eliquis as prescribed.  Secondary Diagnosis:	HTN (hypertension), benign  Goal:	Your blood pressure will be controlled.  Instructions for follow-up, activity and diet:	Continue with your blood pressure medications; eat a heart healthy diet with low salt diet; exercise regularly (consult with your physician or cardiologist first); maintain a heart healthy weight; if you smoke - quit (A resource to help you stop smoking is the [facility] – phone number [phone].); include healthy ways to manage stress. Continue to follow with your primary care physician or cardiologist.  Secondary Diagnosis:	Hyperlipidemia, unspecified hyperlipidemia type  Goal:	Your LDL cholesterol will be less than 70mg/dL  Instructions for follow-up, activity and diet:	Continue with your cholesterol medications. Eat a heart healthy diet that is low in saturated fats and salt, and includes whole grains, fruits, vegetables and lean protein; exercise regularly (consult with your physician or cardiologist first); maintain a heart healthy weight; if you smoke - quit (A resource to help you stop smoking is the [facility] – phone number [phone].). Continue to follow with your primary physician or cardiologist.

## 2017-07-15 NOTE — PROGRESS NOTE ADULT - ASSESSMENT
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 cath lab this am for elective PCI. Pt reports he has been experiencing increasing chest pain on exertion since 11/2017, positive PET scan for anterior ischemia and s/p LHC in 5/31/2017 revealing LM stenosis extending to D1. Unable to reach stenosis due to tortuous aorta at that time, now transferred here for possible laser PCI. On route 2 NTG SL given for chest pain 8/10, /98, HR 80 in ambulance, now denies chest pain or SOB. (14 Jul 2017 12:04)

## 2017-07-20 DIAGNOSIS — Z95.1 PRESENCE OF AORTOCORONARY BYPASS GRAFT: ICD-10-CM

## 2017-09-01 ENCOUNTER — APPOINTMENT (OUTPATIENT)
Dept: PULMONOLOGY | Facility: CLINIC | Age: 82
End: 2017-09-01
Payer: MEDICARE

## 2017-09-01 VITALS — SYSTOLIC BLOOD PRESSURE: 130 MMHG | DIASTOLIC BLOOD PRESSURE: 80 MMHG | HEART RATE: 61 BPM | OXYGEN SATURATION: 95 %

## 2017-09-01 DIAGNOSIS — K21.0 GASTRO-ESOPHAGEAL REFLUX DISEASE WITH ESOPHAGITIS: ICD-10-CM

## 2017-09-01 DIAGNOSIS — J44.9 CHRONIC OBSTRUCTIVE PULMONARY DISEASE, UNSPECIFIED: ICD-10-CM

## 2017-09-01 DIAGNOSIS — R06.09 OTHER FORMS OF DYSPNEA: ICD-10-CM

## 2017-09-01 PROCEDURE — 99214 OFFICE O/P EST MOD 30 MIN: CPT | Mod: 25

## 2017-09-01 PROCEDURE — 94010 BREATHING CAPACITY TEST: CPT

## 2017-09-01 RX ORDER — BUDESONIDE AND FORMOTEROL FUMARATE DIHYDRATE 160; 4.5 UG/1; UG/1
160-4.5 AEROSOL RESPIRATORY (INHALATION) TWICE DAILY
Qty: 3 | Refills: 1 | Status: ACTIVE | COMMUNITY
Start: 2017-09-01 | End: 1900-01-01

## 2017-09-01 RX ORDER — APIXABAN 2.5 MG/1
2.5 TABLET, FILM COATED ORAL
Refills: 0 | Status: ACTIVE | COMMUNITY
Start: 2017-09-01

## 2017-09-01 RX ORDER — AZITHROMYCIN 250 MG/1
250 TABLET, FILM COATED ORAL DAILY
Qty: 45 | Refills: 0 | Status: ACTIVE | COMMUNITY
Start: 2017-09-01 | End: 1900-01-01

## 2017-09-01 RX ORDER — RANOLAZINE 500 MG/1
500 TABLET, FILM COATED, EXTENDED RELEASE ORAL
Refills: 0 | Status: ACTIVE | COMMUNITY
Start: 2017-09-01

## 2017-09-05 ENCOUNTER — EMERGENCY (EMERGENCY)
Facility: HOSPITAL | Age: 82
LOS: 0 days | Discharge: HOME | End: 2017-09-05
Attending: EMERGENCY MEDICINE | Admitting: EMERGENCY MEDICINE
Payer: COMMERCIAL

## 2017-09-05 VITALS
DIASTOLIC BLOOD PRESSURE: 87 MMHG | WEIGHT: 210.1 LBS | OXYGEN SATURATION: 100 % | TEMPERATURE: 99 F | HEART RATE: 88 BPM | HEIGHT: 72 IN | RESPIRATION RATE: 18 BRPM | SYSTOLIC BLOOD PRESSURE: 143 MMHG

## 2017-09-05 VITALS
HEART RATE: 77 BPM | SYSTOLIC BLOOD PRESSURE: 154 MMHG | DIASTOLIC BLOOD PRESSURE: 64 MMHG | RESPIRATION RATE: 16 BRPM | OXYGEN SATURATION: 100 % | TEMPERATURE: 98 F

## 2017-09-05 DIAGNOSIS — I25.10 ATHEROSCLEROTIC HEART DISEASE OF NATIVE CORONARY ARTERY WITHOUT ANGINA PECTORIS: ICD-10-CM

## 2017-09-05 DIAGNOSIS — J44.9 CHRONIC OBSTRUCTIVE PULMONARY DISEASE, UNSPECIFIED: ICD-10-CM

## 2017-09-05 DIAGNOSIS — Z90.49 ACQUIRED ABSENCE OF OTHER SPECIFIED PARTS OF DIGESTIVE TRACT: Chronic | ICD-10-CM

## 2017-09-05 DIAGNOSIS — Z90.2 ACQUIRED ABSENCE OF LUNG [PART OF]: Chronic | ICD-10-CM

## 2017-09-05 DIAGNOSIS — Z79.899 OTHER LONG TERM (CURRENT) DRUG THERAPY: ICD-10-CM

## 2017-09-05 DIAGNOSIS — Z95.5 PRESENCE OF CORONARY ANGIOPLASTY IMPLANT AND GRAFT: ICD-10-CM

## 2017-09-05 DIAGNOSIS — R42 DIZZINESS AND GIDDINESS: ICD-10-CM

## 2017-09-05 DIAGNOSIS — I48.91 UNSPECIFIED ATRIAL FIBRILLATION: ICD-10-CM

## 2017-09-05 DIAGNOSIS — Z95.1 PRESENCE OF AORTOCORONARY BYPASS GRAFT: Chronic | ICD-10-CM

## 2017-09-05 DIAGNOSIS — Z95.0 PRESENCE OF CARDIAC PACEMAKER: Chronic | ICD-10-CM

## 2017-09-05 DIAGNOSIS — Z79.02 LONG TERM (CURRENT) USE OF ANTITHROMBOTICS/ANTIPLATELETS: ICD-10-CM

## 2017-09-05 DIAGNOSIS — Z79.82 LONG TERM (CURRENT) USE OF ASPIRIN: ICD-10-CM

## 2017-09-05 LAB
ANION GAP SERPL CALC-SCNC: 4 MMOL/L — LOW (ref 5–17)
APTT BLD: 29.7 SEC — SIGNIFICANT CHANGE UP (ref 27.5–37.4)
BUN SERPL-MCNC: 17 MG/DL — SIGNIFICANT CHANGE UP (ref 7–23)
CALCIUM SERPL-MCNC: 9 MG/DL — SIGNIFICANT CHANGE UP (ref 8.5–10.1)
CHLORIDE SERPL-SCNC: 103 MMOL/L — SIGNIFICANT CHANGE UP (ref 96–108)
CO2 SERPL-SCNC: 31 MMOL/L — SIGNIFICANT CHANGE UP (ref 22–31)
CREAT SERPL-MCNC: 0.87 MG/DL — SIGNIFICANT CHANGE UP (ref 0.5–1.3)
GLUCOSE SERPL-MCNC: 90 MG/DL — SIGNIFICANT CHANGE UP (ref 70–99)
HCT VFR BLD CALC: 37.1 % — LOW (ref 39–50)
HGB BLD-MCNC: 12.2 G/DL — LOW (ref 13–17)
INR BLD: 1.36 RATIO — HIGH (ref 0.88–1.16)
MCHC RBC-ENTMCNC: 28.3 PG — SIGNIFICANT CHANGE UP (ref 27–34)
MCHC RBC-ENTMCNC: 32.9 GM/DL — SIGNIFICANT CHANGE UP (ref 32–36)
MCV RBC AUTO: 85.9 FL — SIGNIFICANT CHANGE UP (ref 80–100)
PLATELET # BLD AUTO: 167 K/UL — SIGNIFICANT CHANGE UP (ref 150–400)
POTASSIUM SERPL-MCNC: 3.6 MMOL/L — SIGNIFICANT CHANGE UP (ref 3.5–5.3)
POTASSIUM SERPL-SCNC: 3.6 MMOL/L — SIGNIFICANT CHANGE UP (ref 3.5–5.3)
PROTHROM AB SERPL-ACNC: 14.8 SEC — HIGH (ref 9.8–12.7)
RBC # BLD: 4.32 M/UL — SIGNIFICANT CHANGE UP (ref 4.2–5.8)
RBC # FLD: 15.6 % — HIGH (ref 10.3–14.5)
SODIUM SERPL-SCNC: 138 MMOL/L — SIGNIFICANT CHANGE UP (ref 135–145)
TROPONIN I SERPL-MCNC: 0.02 NG/ML — SIGNIFICANT CHANGE UP (ref 0.01–0.04)
WBC # BLD: 7.7 K/UL — SIGNIFICANT CHANGE UP (ref 3.8–10.5)
WBC # FLD AUTO: 7.7 K/UL — SIGNIFICANT CHANGE UP (ref 3.8–10.5)

## 2017-09-05 PROCEDURE — 71010: CPT | Mod: 26

## 2017-09-05 PROCEDURE — 70450 CT HEAD/BRAIN W/O DYE: CPT | Mod: 26

## 2017-09-05 PROCEDURE — 93010 ELECTROCARDIOGRAM REPORT: CPT | Mod: 76

## 2017-09-05 PROCEDURE — 99285 EMERGENCY DEPT VISIT HI MDM: CPT

## 2017-09-05 NOTE — ED PROVIDER NOTE - NEUROLOGICAL, MLM
Alert and oriented, no focal deficits, no motor or sensory deficits. neuro: CN II - XII intact, EOMI, PERRL, no papilledema, 5/5 muscle strength x 4 extremities, no sensory deficits, 2+ dtr globally, negative babinski, no ataxic gait, normal NETTA and FNT, normal romberg

## 2017-09-05 NOTE — ED PROVIDER NOTE - OBJECTIVE STATEMENT
80 y/o pt with PMHx of COPD, CAD, DVT, s/p CABG presents to the ED c/o dizziness, intermittently, for 2 weeks, aggravated by movement.  He states the room spinning during the night.  +sore throat, +HA, +SOB.   Vertigo aggravated by movement.  Pt currently on azithromycin. denies fever. denies HA or neck pain. no chest pain. no abd pain. no n/v/d. no urinary f/u/d. no back pain. no motor or sensory deficits. denies illicit drug use. no recent travel. no rash. no other acute issues symptoms or concerns 82 y/o pt with PMHx of COPD, CAD, s/p CABG presents to the ED c/o dizziness, intermittently, for 2 weeks, aggravated by movement.  He states the room spinning during the night.  +sore throat, +HA, +SOB.   He says vertigo is making it difficult to walk.  Pt currently on azithromycin. denies fever. denies neck pain. no chest pain. no abd pain. no n/v/d. no urinary f/u/d. no back pain. no motor or sensory deficits. denies illicit drug use. no recent travel. no rash. no other acute issues symptoms or concerns

## 2017-09-05 NOTE — ED PROVIDER NOTE - ENMT NEGATIVE STATEMENT, MLM
Oriented - self; Oriented - place; Oriented - time
Ears: no ear pain and no hearing problems.Nose: no nasal congestion and no nasal drainage.Mouth/Throat: no dysphagia, +sore throat.Neck: no lumps, no pain, no stiffness and no swollen glands.

## 2017-09-05 NOTE — ED PROVIDER NOTE - MEDICAL DECISION MAKING DETAILS
+ bhit neuro exam showing neuro: CN II - XII intact, EOMI, PERRL, no papilledema, 5/5 muscle strength x 4 extremities, no sensory deficits, 2+ dtr globally, negative babinski, no ataxic gait, normal NETTA and FNT, normal romberg   do not suspect clincal posty circulation infacrt return to ed for intractable HA, persistent vomiting, or new onset motor/sensory deficits

## 2017-09-05 NOTE — ED ADULT NURSE NOTE - OBJECTIVE STATEMENT
Pt c/o HA he believes is associated with his vertigo. Pt also c/o sore throat and diff breathing he thinks asso. with his emphysema. Pt not in any distress, resp even and unlabored.

## 2017-09-05 NOTE — ED PROVIDER NOTE - NEURO NEGATIVE STATEMENT, MLM
+dizziness. no loss of consciousness, no gait abnormality,  +headache, no sensory deficits, and no weakness.

## 2017-10-02 ENCOUNTER — RX RENEWAL (OUTPATIENT)
Age: 82
End: 2017-10-02

## 2017-12-04 ENCOUNTER — APPOINTMENT (OUTPATIENT)
Dept: PULMONOLOGY | Facility: CLINIC | Age: 82
End: 2017-12-04

## 2021-03-30 NOTE — ASU PATIENT PROFILE, ADULT - TRANSFUSION REACTION, PREVIOUS, PROFILE
Chief Complaint   Patient presents with     Derm Problem     Psoriasis f/u      Tree May, CMA    
no

## 2022-08-11 NOTE — ED PROVIDER NOTE - SKIN, MLM
X Size Of Lesion In Cm (Optional): 0 Detail Level: Detailed Anatomic Location From Referring Provider: right forearm Skin normal color for race, warm, dry and intact. No evidence of rash.

## 2022-11-25 NOTE — H&P CARDIOLOGY - BSA (M2)
Persistent cellulitis of left lower extremity, refill ABX to extend treatment f    Keflex addition 7 days, bactrim additional 5 days
2.17

## 2024-02-23 NOTE — PATIENT PROFILE ADULT. - AS SC BRADEN NUTRITION
Outpatient Consultation / H&P    Date of Consultation:  2/23/2024    PCP:  Main Velazco III, MD     Referring Provider:  Dr. Velazco     Chief Complaint:   Chief Complaint   Patient presents with    Other     Patient is here regarding blockage in his legs. Pamlr        History of Present Illness:   We are asked to see this patient in consultation by Dr. Velazco regarding PVD.   Jamal Hutchison is a 75 y.o. male who is S/P L CEA 5/2023.    He reports short distance claudication, particularly left leg beginning at about 100ft.   No pain at rest.    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      Past Medical History:  Past Medical History:   Diagnosis Date    Atrial fibrillation with rapid ventricular response (HCC)     CAD (coronary artery disease)     CVA (cerebral vascular accident) (HCC)     Hearing loss     right    Hyperlipidemia     Hypertension     TIA (transient ischemic attack)        Past Surgical History:  Past Surgical History:   Procedure Laterality Date    CAROTID ENDARTERECTOMY Left 5/25/2023    LEFT CAROTID ENDARTERECTOMY performed by Nathan Castaneda MD at [facility] OR    COLONOSCOPY      HERNIA REPAIR      TIBIA FRACTURE SURGERY Left        Home Medications:   Prior to Admission medications    Medication Sig Start Date End Date Taking? Authorizing Provider   dilTIAZem (CARDIZEM CD) 240 MG extended release capsule Take 1 capsule by mouth in the morning and at bedtime 8/22/23  Yes HERMINIA Murry Jr., MD   magnesium oxide (MAG-OX) 400 MG tablet Take 1 tablet by mouth daily 5/26/23  Yes Linda Michele APRN - CNP   rivaroxaban (XARELTO) 20 MG TABS tablet Take 1 tablet by mouth Daily with supper 5/15/23  Yes Alyssa Hurtado APRN - CNP   aspirin 81 MG EC tablet Take 1 tablet by mouth daily   Yes Provider, MD Yoko   atorvastatin (LIPITOR) 80 MG tablet Take 1 tablet by mouth nightly  Patient not taking: Reported on 10/17/2023 5/15/23   Alyssa Hurtado APRN - CNP 
(3) adequate
